# Patient Record
Sex: FEMALE | Race: WHITE | NOT HISPANIC OR LATINO | Employment: PART TIME | URBAN - METROPOLITAN AREA
[De-identification: names, ages, dates, MRNs, and addresses within clinical notes are randomized per-mention and may not be internally consistent; named-entity substitution may affect disease eponyms.]

---

## 2019-09-18 ENCOUNTER — TRANSFERRED RECORDS (OUTPATIENT)
Dept: MULTI SPECIALTY CLINIC | Facility: CLINIC | Age: 59
End: 2019-09-18

## 2019-09-18 LAB
HPV ABSTRACT: NORMAL
PAP-ABSTRACT: NORMAL

## 2020-02-12 ENCOUNTER — OFFICE VISIT (OUTPATIENT)
Dept: FAMILY MEDICINE | Facility: CLINIC | Age: 60
End: 2020-02-12
Payer: COMMERCIAL

## 2020-02-12 ENCOUNTER — ANCILLARY PROCEDURE (OUTPATIENT)
Dept: GENERAL RADIOLOGY | Facility: CLINIC | Age: 60
End: 2020-02-12
Attending: FAMILY MEDICINE
Payer: COMMERCIAL

## 2020-02-12 VITALS
RESPIRATION RATE: 18 BRPM | HEART RATE: 69 BPM | SYSTOLIC BLOOD PRESSURE: 106 MMHG | TEMPERATURE: 97.6 F | HEIGHT: 62 IN | DIASTOLIC BLOOD PRESSURE: 64 MMHG | BODY MASS INDEX: 24.8 KG/M2 | WEIGHT: 134.8 LBS | OXYGEN SATURATION: 97 %

## 2020-02-12 DIAGNOSIS — G89.29 CHRONIC RIGHT SHOULDER PAIN: ICD-10-CM

## 2020-02-12 DIAGNOSIS — M25.511 CHRONIC RIGHT SHOULDER PAIN: ICD-10-CM

## 2020-02-12 PROCEDURE — 73030 X-RAY EXAM OF SHOULDER: CPT | Mod: RT

## 2020-02-12 PROCEDURE — 99203 OFFICE O/P NEW LOW 30 MIN: CPT | Performed by: FAMILY MEDICINE

## 2020-02-12 RX ORDER — MULTIPLE VITAMINS W/ MINERALS TAB 9MG-400MCG
1 TAB ORAL DAILY
COMMUNITY
End: 2021-07-27

## 2020-02-12 ASSESSMENT — MIFFLIN-ST. JEOR: SCORE: 1136.7

## 2020-02-12 NOTE — PROGRESS NOTES
Subjective     Aixa Hester is a 59 year old female who presents to clinic today for the following health issues:    HPI     New Patient to [place]/Transfer care  Previous PCP: [facility]    Care everywhere obtained and reviewed    Joint Pain-right shoulder pain    Onset: 2019    Description:   Location: right shoulder  Character: intense pain     Intensity: pain with pressure     Progression of Symptoms: same    Accompanying Signs & Symptoms:  Other symptoms: radiation of pain down arm, numbness and tingling    History:   Previous similar pain: YES- previous injury to shoulder 22 years ago       Precipitating factors:     Trauma or overuse: none specific   Improved by: was seen by physical therapy through [facility]- patient has continued those exercises at home     Therapies Tried and outcome: PT- effective.  Requesting for refill.      HEALTH CARE MAINTENANCE: Up-to-date with a Pap test  Due for annual physical in 2020.    Patient Active Problem List   Diagnosis     Chronic right shoulder pain     Past Surgical History:   Procedure Laterality Date     arm surgery Left      COLONOSCOPY       COLONOSCOPY  10/29/2012    Procedure: COLONOSCOPY;  Colonoscopy;  Surgeon: Tommy Alegre MD;  Location: [facility]       Social History     Tobacco Use     Smoking status: Never Smoker     Smokeless tobacco: Never Used   Substance Use Topics     Alcohol use: Yes     Comment: seldom     Family History   Problem Relation Age of Onset     Cerebral aneurysm Mother         at age 72     Lung Cancer Mother      Coronary Artery Disease Father         Atrial Fib,  at 87     Breast Cancer No family hx of      Colon Cancer No family hx of          Current Outpatient Medications   Medication Sig Dispense Refill     calcium-magnesium (CALMAG) 500-250 MG TABS per tablet Take 1 tablet by mouth 2 times daily       Fish Oil-Cholecalciferol (OMEGA-3 FISH OIL/VITAMIN D3 PO)        melatonin 5 MG tablet Take 5 mg by  "mouth nightly as needed for sleep       Multiple Vitamins-Minerals (PRESERVISION AREDS 2 PO)        multivitamin w/minerals (MULTI-VITAMIN) tablet Take 1 tablet by mouth daily       Allergies   Allergen Reactions     Nka [No Known Allergies]        Reviewed and updated as needed this visit by Provider  Tobacco  Med Hx  Surg Hx  Fam Hx  Soc Hx        Review of Systems   ROS COMP: Constitutional, HEENT, cardiovascular, pulmonary, gi and gu systems are negative, except as otherwise noted.      Objective    /64   Pulse 69   Temp 97.6  F (36.4  C) (Tympanic)   Resp 18   Ht 1.57 m (5' 1.81\")   Wt 61.1 kg (134 lb 12.8 oz)   SpO2 97%   Breastfeeding No   BMI 24.81 kg/m    Body mass index is 24.81 kg/m .  Physical Exam   GENERAL: healthy, alert and no distress  EYES: Eyes grossly normal to inspection, PERRL and conjunctivae and sclerae normal  HENT: ear canals and TM's normal, nose and mouth without ulcers or lesions  NECK: no adenopathy, no asymmetry, masses, or scars and thyroid normal to palpation  RESP: lungs clear to auscultation - no rales, rhonchi or wheezes  CV: regular rate and rhythm, normal S1 S2, no S3 or S4, no murmur, click or rub, no peripheral edema and peripheral pulses strong  MS: Right shoulder no significant AC or deltoid tenderness.  Normal strength and muscle mass, no deformities, no erythema, induration, or nodules, no evidence of joint effusion. Painful arc with otherwise FROM.     Diagnostic Test Results:  Labs reviewed in Epic  See orders below.        Assessment & Plan     Aixa was seen today for shoulder pain.    Diagnoses and all orders for this visit:    Chronic right shoulder pain, suspect rotator cuff disease  -     PHYSICAL THERAPY REFERRAL  -     XR Shoulder Right 2 Views-if unremarkable and shoulder pain persists, will consider a shoulder MRI to further evaluate.  -    OTC NSAID as needed.      We will notify her with results.    Return in about 6 months (around " 8/12/2020) for Physical Exam and as needed throughout the year.    Carlene Greer MD  [facility]

## 2020-02-21 ENCOUNTER — THERAPY VISIT (OUTPATIENT)
Dept: PHYSICAL THERAPY | Facility: CLINIC | Age: 60
End: 2020-02-21
Payer: COMMERCIAL

## 2020-02-21 DIAGNOSIS — G89.29 CHRONIC RIGHT SHOULDER PAIN: Primary | ICD-10-CM

## 2020-02-21 DIAGNOSIS — M25.511 CHRONIC RIGHT SHOULDER PAIN: Primary | ICD-10-CM

## 2020-02-21 PROCEDURE — 97112 NEUROMUSCULAR REEDUCATION: CPT | Mod: GP | Performed by: PHYSICAL THERAPIST

## 2020-02-21 PROCEDURE — 97161 PT EVAL LOW COMPLEX 20 MIN: CPT | Mod: GP | Performed by: PHYSICAL THERAPIST

## 2020-02-21 PROCEDURE — 97110 THERAPEUTIC EXERCISES: CPT | Mod: GP | Performed by: PHYSICAL THERAPIST

## 2020-02-21 NOTE — PROGRESS NOTES
"Physical Therapy Initial Evaluation  February 20, 2020     MD Instructions/Precautions/Restrictions: PT eval and treat.     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 Skilled PT services are necessary in order to reduce impairments and improve independent function.     Subjective:   Date of Onset: 8/1/2019  C/C: chronic right shoulder pain; RHD  Location: front of shoulder on the right, travels down front and lateral side of arm; used to travel to the fingers (not sure which ones) but that is diminishing  Previous: PT for 1 visit focused treatment on the back exercises (rows, IR/ER, pull down), massage  Quality of pain is aching and sharp. Denies locking and deep pain. Pains are described as intermittent in nature. Pain is worse: not dependent on time of day. Pain is rated 2-3/10 with pain medication; 4-5/10  History of symptoms: 1998 had a fall - sent to PT and recovered to 95%. Pain began suddenly as the result of insidious onset. Since onset, symptoms are improving.  Worsened by: bicep curl at 90 in Pilates class, reaching across, overhead stretch up, push up  Alleviated by: PT exercises, massage, activity afterwards  General health as reported by patient: good  Pertinent medical/surgical history: Refer to health history in EMR. Imaging: x-ray. Current occupational status: , . Patient's goals are: decrease pain, dressing without pain, return to cleaning and cooking no issues. Return to MD:  PRN.     Objective:  SHOULDER:    Alignment:  Depression: normal  UR/DR increased DR R>L  Abduction/add R >3\"  IR/ER increased ER L>R    Cervical Screen: normal + OP    AROM Shoulder Flexion/Abduction  Decreased eccentric scapular control- increased DR    Correction Secondary Test decrease in pain with " assist to control DR    Shoulder:   PROM R AROM L AROM R MMT L MMT R   Flex 170 170 165* with lowering onset at 90 5/5 +3/5*   Abd  170 165* with lowering onset at 90 worse than flexion     IR  T5 L1 5/5 5/5   ER  70 70* 5/5 4/5*     Special tests:   L R   Impingement     Neer's  -   Hawkin's-Satish  -   Labral     Talbot's  +   Crank     Instability     Apprehension (anterior)     Relocation (anterior)     Biceps      Speed's  +   Yeargsons  +   Bicep Load     Rotator Cuff Tear     Drop Arm  -   Belly Press     Lift off      ER lag at 90/90     ER lat at 45/45       Palpation: bicipital groove reproduce pain    Assessment/Plan:    The patient is a 59 year old female with chief complaint of chronic right shoulder pain.    The patient has the following significant findings with corresponding treatment plan.  Diagnosis 1:  Right shoulder pain    Pain -  self management, education and home program  Decreased ROM/flexibility - manual therapy, therapeutic exercise and home program  Decreased joint mobility - manual therapy, therapeutic exercise and home program  Decreased strength - therapeutic exercise, therapeutic activities and home program  Impaired balance - neuro re-education, therapeutic activities and home program  Decreased proprioception - neuro re-education and therapeutic activities  Impaired gait - gait training and assistive devices  Impaired muscle performance - neuro re-education and home program  Decreased function - therapeutic activities and home program  Impaired posture - neuro re-education, therapeutic activities and home program  Instability -  Therapeutic Activity, Therapeutic Exercise, Neuromuscular Re-education, Splinting/Taping/Bracing/Orthotic, home program    Therapy Evaluation Codes:   1) History comprised of:   Personal factors that impact the plan of care:      Please refer to health history in EMR.    Comorbidity factors that impact the plan of care are:      Please refer to health  history in EMR.     Medications impacting care: None.  2) Examination of Body Systems comprised of:   Body structures and functions that impact the plan of care:      Shoulder.   Activity limitations that impact the plan of care are:      Cooking, Dressing, Lifting and Sleeping.   Clinical presentation characteristics are:    Stable/Uncomplicated.  3) Presentation comprised of:   Presentation scored as Low complexity with uncomplicated characteristics..  4) Decision-Making    Low complexity using standardized patient assessment instrument and/or measureable assessment of functional outcome.  Cumulative Therapy Evaluation is: Low complexity.    Previous and current functional limitations:  (See Goal Flow Sheet for this information)    Short term and Long term goals: (See Goal Flow Sheet for this information)     Communication ability:  Patient appears to be able to clearly communicate and understand verbal and written communication and follow directions correctly.  Treatment Explanation - The following has been discussed with the patient: RX ordered/plan of care, anticipated outcomes, and possible risks and side effects.  This patient would benefit from PT intervention to resume normal activities.   Rehab potential is excellent.    Frequency:  1 X week, once daily  Duration:  for 6 weeks  Discharge Plan: Achieve all LTGs, be independent in home treatment program, and reach maximal therapeutic benefit.    Please refer to the daily flowsheet for treatment today, total treatment time and time spent performing 1:1 timed codes.

## 2020-02-28 ENCOUNTER — THERAPY VISIT (OUTPATIENT)
Dept: PHYSICAL THERAPY | Facility: CLINIC | Age: 60
End: 2020-02-28
Payer: COMMERCIAL

## 2020-02-28 DIAGNOSIS — G89.29 CHRONIC RIGHT SHOULDER PAIN: ICD-10-CM

## 2020-02-28 DIAGNOSIS — M25.511 CHRONIC RIGHT SHOULDER PAIN: ICD-10-CM

## 2020-02-28 PROCEDURE — 97110 THERAPEUTIC EXERCISES: CPT | Mod: GP | Performed by: PHYSICAL THERAPIST

## 2020-02-28 PROCEDURE — 97112 NEUROMUSCULAR REEDUCATION: CPT | Mod: GP | Performed by: PHYSICAL THERAPIST

## 2020-02-28 NOTE — PROGRESS NOTES
Assessment/Plan:    DISCHARGE REPORT    Progress reporting period is from 2/21/20 to 2/28/20.       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 Wants to go over HEP and which exercises to perform if shoulder gets worse  Current pain level is 0/10  Previous pain level was  4/10  .   Changes in function:  Yes (See Goal flowsheet attached for changes in current functional level)  Adverse reaction to treatment or activity: None    OBJECTIVE  Changes noted in objective findings:      Shoulder:    PROM R AROM L AROM R   Flex 170 170 170   Abd   170 170* with lowering onset at 90    IR   T5 L1   ER   70 70* 1/10     ASSESSMENT/PLAN  Updated problem list and treatment plan: Diagnosis 1:  Right Shoulder pain  Pain -  self management, education and home program  Decreased strength - therapeutic exercise and therapeutic activities  STG/LTGs have been met or progress has been made towards goals:  Yes (See Goal flow sheet completed today.)  Assessment of Progress: The patient's condition is improving.  Self Management Plans:  Patient has been instructed in a home treatment program.  I have re-evaluated this patient and find that the nature, scope, duration and intensity of the therapy is appropriate for the medical condition of the patient.  Aixa continues to require the following intervention to meet STG and LTG's:  PT intervention is no longer required to meet STG/LTG.    Recommendations:  This patient is ready to be discharged from therapy and continue their home treatment program.    Please refer to the daily flowsheet for treatment today, total treatment time and time spent performing 1:1 timed codes.

## 2020-03-19 ENCOUNTER — TELEPHONE (OUTPATIENT)
Dept: FAMILY MEDICINE | Facility: CLINIC | Age: 60
End: 2020-03-19

## 2020-03-19 NOTE — TELEPHONE ENCOUNTER
Patient is due for shingles booster in next few weeks. She received first one at [facility] in September, but due to changing insurance she needs to come to [place]. Please call patient.

## 2020-07-06 ENCOUNTER — VIRTUAL VISIT (OUTPATIENT)
Dept: FAMILY MEDICINE | Facility: OTHER | Age: 60
End: 2020-07-06

## 2020-07-06 ENCOUNTER — NURSE TRIAGE (OUTPATIENT)
Dept: NURSING | Facility: CLINIC | Age: 60
End: 2020-07-06

## 2020-07-06 NOTE — PROGRESS NOTES
"Date: 2020 08:13:46  Clinician: Damon Sarmiento  Clinician NPI: [id number]  Patient: Aixa Hester  Patient : 1960  Patient Address: [address] [place], [place] [address] [address]  Patient Phone: [phone]  Visit Protocol: URI  Patient Summary:  Aixa is a 59 year old ( : 1960 ) female who initiated a Visit for COVID-19 (Coronavirus) evaluation and screening. When asked the question \"Please sign me up to receive news, health information and promotions from [employer].\", Aixa responded \"No\".    Aixa states her symptoms started 1-2 days ago.   Her symptoms consist of diarrhea, malaise, ear pain, a sore throat, and myalgia. She is experiencing mild difficulty breathing with activities but can speak normally in full sentences.   Symptom details   Sore throat: Aixa reports having mild throat pain (1-3 on a 10 point pain scale), does not have exudate on her tonsils, and can swallow liquids. She is not sure if the lymph nodes in her neck are enlarged. A rash has not appeared on the skin since the sore throat started.    Aixa denies having wheezing, nausea, teeth pain, ageusia, vomiting, rhinitis, headache, chills, anosmia, facial pain or pressure, fever, cough, and nasal congestion. She also denies having recent facial or sinus surgery in the past 60 days and taking antibiotic medication in the past month.   Precipitating events  Aixa is not sure if she has been exposed to someone with strep throat. She has not recently been exposed to someone with influenza. Aixa has been in close contact with the following high risk individuals: people with asthma, heart disease or diabetes and adults 65 or older.   Pertinent COVID-19 (Coronavirus) information  In the past 14 days, Aixa has not worked in a congregate living setting.   She does not work or volunteer as healthcare worker or a  and does not work or volunteer in a healthcare facility.   Aixa also has not lived in a congregate living " setting in the past 14 days. She does not live with a healthcare worker.   Aixa has not had a close contact with a laboratory-confirmed COVID-19 patient within 14 days of symptom onset.   Pertinent medical history  Aixa does not get yeast infections when she takes antibiotics.   Aixa does not need a return to work/school note.   Weight: 135 lbs   Aixa does not smoke or use smokeless tobacco.   Weight: 135 lbs    MEDICATIONS: No current medications, ALLERGIES: NKDA  Clinician Response:  Dear Aixa,   Your symptoms show that you may have coronavirus (COVID-19). This illness can cause fever, cough and trouble breathing. Many people get a mild case and get better on their own. Some people can get very sick.  What should I do?  We would like to test you for this virus.   1. Please call [phone] to schedule your visit. Explain that you were referred by [facility] to have a COVID-19 test. Be ready to share your [facility] visit ID number.  The following will serve as your written order for this COVID Test, ordered by me, for the indication of suspected COVID [Z20.828]: The test will be ordered in [employer], our electronic health record, after you are scheduled. It will show as ordered and authorized by Antoni Roque MD.  Order: COVID-19 (Coronavirus) PCR for SYMPTOMATIC testing from [facility].      2. When it's time for your COVID test:  Stay at least 6 feet away from others. (If someone will drive you to your test, stay in the backseat, as far away from the  as you can.)   Cover your mouth and nose with a mask, tissue or washcloth.  Go straight to the testing site. Don't make any stops on the way there or back.      3.Starting now: Stay home and away from others (self-isolate) until:   You've had no fever---and no medicine that reduces fever---for 3 full days (72 hours). And...   Your other symptoms have gotten better. For example, your cough or breathing has improved. And...   At least 10 days have passed since your symptoms  "started.       During this time, don't leave the house except for testing or medical care.   Stay in your own room, even for meals. Use your own bathroom if you can.   Stay away from others in your home. No hugging, kissing or shaking hands. No visitors.  Don't go to work, school or anywhere else.    Clean \"high touch\" surfaces often (doorknobs, counters, handles, etc.). Use a household cleaning spray or wipes. You'll find a full list of  on the EPA website: www.epa.gov/pesticide-registration/list-n-disinfectants-use-against-sars-cov-2.   Cover your mouth and nose with a mask, tissue or washcloth to avoid spreading germs.  Wash your hands and face often. Use soap and water.  Caregivers in these groups are at risk for severe illness due to COVID-19:  o People 65 years and older  o People who live in a nursing home or long-term care facility  o People with chronic disease (lung, heart, cancer, diabetes, kidney, liver, immunologic)  o People who have a weakened immune system, including those who:   Are in cancer treatment  Take medicine that weakens the immune system, such as corticosteroids  Had a bone marrow or organ transplant  Have an immune deficiency  Have poorly controlled HIV or AIDS  Are obese (body mass index of 40 or higher)  Smoke regularly   o Caregivers should wear gloves while washing dishes, handling laundry and cleaning bedrooms and bathrooms.  o Use caution when washing and drying laundry: Don't shake dirty laundry, and use the warmest water setting that you can.  o For more tips, go to www.cdc.gov/coronavirus/2019-ncov/downloads/10Things.pdf.    4.Sign up for [employer]. We know it's scary to hear that you might have COVID-19. We want to track your symptoms to make sure you're okay over the next 2 weeks. Please look for an email from Lizeth Vanessa---this is a free, online program that we'll use to keep in touch. To sign up, follow the link in the email. Learn more at " [email or web address]  How can I take care of myself?   Get lots of rest. Drink extra fluids (unless a doctor has told you not to).   Take Tylenol (acetaminophen) for fever or pain. If you have liver or kidney problems, ask your family doctor if it's okay to take Tylenol.   Adults can take either:    650 mg (two 325 mg pills) every 4 to 6 hours, or...   1,000 mg (two 500 mg pills) every 8 hours as needed.    Note: Don't take more than 3,000 mg in one day. Acetaminophen is found in many medicines (both prescribed and over-the-counter medicines). Read all labels to be sure you don't take too much.   For children, check the Tylenol bottle for the right dose. The dose is based on the child's age or weight.    If you have other health problems (like cancer, heart failure, an organ transplant or severe kidney disease): Call your specialty clinic if you don't feel better in the next 2 days.       Know when to call 911. Emergency warning signs include:    Trouble breathing or shortness of breath Pain or pressure in the chest that doesn't go away Feeling confused like you haven't felt before, or not being able to wake up Bluish-colored lips or face.  Where can I get more information?   [facility] -- About COVID-19: [email or web address]   CDC -- What to Do If You're Sick: www.cdc.gov/coronavirus/2019-ncov/about/steps-when-sick.html   CDC -- Ending Home Isolation: www.cdc.gov/coronavirus/2019-ncov/hcp/disposition-in-home-patients.html   CDC -- Caring for Someone: www.cdc.gov/coronavirus/2019-ncov/if-you-are-sick/care-for-someone.html   [facility] -- Interim Guidance for Hospital Discharge to Home: www.health.[facility].mn.us/diseases/coronavirus/hcp/hospdischarge.pdf   [facility] clinical trials (COVID-19 research studies): clinicalaffairs.[facility]-clinical-trials    Below are the COVID-19 hotlines at the [place] Department of Health ([facility]). Interpreters are available.    [facility]  questions: Call 517-530-0430 or 1-219.126.5486 (7 a.m. to 7 p.m.) For questions about schools and childcare: Call 206-682-9284 or 1-797.298.7868 (7 a.m. to 7 p.m.)    Diagnosis: Acute upper respiratory infection, unspecified  Diagnosis ICD: J06.9

## 2020-07-06 NOTE — TELEPHONE ENCOUNTER
Aixa with bilat inner ear pain, slightly sore throat and chest tightness.  Chest tightness improved after warm fluids, no SOB but is inhibiting deep inspiration.  Ears occasionally itchy, which she does have with minor seasonal allergies.  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      Reason for Disposition    Chest pain or pressure    [1] COVID-19 diagnosed by HCP (doctor, NP or PA) AND [2] mild symptoms (e.g., cough, fever, others) AND [3] no complications or SOB    Protocols used: CORONAVIRUS (COVID-19) DIAGNOSED OR [id number] 5.16.20      COVID 19 Nurse Triage Plan/Patient Instructions    Please be aware that novel coronavirus (COVID-19) may be circulating in the community. If you develop symptoms such as fever, cough, or SOB or if you have concerns about the presence of another infection including coronavirus (COVID-19), please contact your health care provider or visit [email or web address].     Disposition/Instructions    Patient to schedule a Virtual Visit with provider. Reference Visit Selection Guide.    Thank you for taking steps to prevent the spread of this virus.  o Limit your contact with others.  o Wear a simple mask to cover your cough.  o Wash your hands well and often.    Resources    [facility]: About COVID-19: [email or web address]    CDC: What to Do If You're Sick: www.cdc.gov/coronavirus/2019-ncov/about/steps-when-sick.html    CDC: Ending Home Isolation: www.cdc.gov/coronavirus/2019-ncov/hcp/disposition-in-home-patients.html     CDC: Caring for Someone: www.cdc.gov/coronavirus/2019-ncov/if-you-are-sick/care-for-someone.html     [facility]: Interim Guidance for Hospital Discharge to Home: www.health.[facility].[place].us/diseases/coronavirus/hcp/hospdischarge.pdf    [facility] clinical trials (COVID-19 research studies): clinicalaffairs.[facility]/umn-clinical-trials     Below are the COVID-19 hotlines at the [place] Department of Health ([facility]). Interpreters  are available.   o For health questions: Call 838-968-4363 or 1-459.291.3833 (7 a.m. to 7 p.m.)  o For questions about schools and childcare: Call 859-006-9050 or 1-186.792.3268 (7 a.m. to 7 p.m.)

## 2020-07-07 DIAGNOSIS — Z20.822 ENCOUNTER FOR LABORATORY TESTING FOR COVID-19 VIRUS: Primary | ICD-10-CM

## 2020-07-07 PROCEDURE — 99207 ZZC NO CHARGE LOS: CPT

## 2020-07-07 PROCEDURE — U0003 INFECTIOUS AGENT DETECTION BY NUCLEIC ACID (DNA OR RNA); SEVERE ACUTE RESPIRATORY SYNDROME CORONAVIRUS 2 (SARS-COV-2) (CORONAVIRUS DISEASE [COVID-19]), AMPLIFIED PROBE TECHNIQUE, MAKING USE OF HIGH THROUGHPUT TECHNOLOGIES AS DESCRIBED BY CMS-2020-01-R: HCPCS | Performed by: FAMILY MEDICINE

## 2020-07-07 NOTE — LETTER
July 11, 2020        Aixa Hester  [address] [address]    This letter provides a written record that you were tested for COVID-19 on 7/7/20.       Your result was negative. This means that we didn t find the virus that causes COVID-19 in your sample. A test may show negative when you do actually have the virus. This can happen when the virus is in the early stages of infection, before you feel illness symptoms.    If you have symptoms   Stay home and away from others (self-isolate) until you meet ALL of the guidelines below:    You ve had no fever--and no medicine that reduces fever--for 3 full days (72 hours). And      Your other symptoms have gotten better. For example, your cough or breathing has improved. And     At least 10 days have passed since your symptoms started.    During this time:    Stay home. Don t go to work, school or anywhere else.     Stay in your own room, including for meals. Use your own bathroom if you can.    Stay away from others in your home. No hugging, kissing or shaking hands. No visitors.    Clean  high touch  surfaces often (doorknobs, counters, handles, etc.). Use a household cleaning spray or wipes. You can find a full list on the EPA website at www.epa.gov/pesticide-registration/list-n-disinfectants-use-against-sars-cov-2.    Cover your mouth and nose with a mask, tissue or washcloth to avoid spreading germs.    Wash your hands and face often with soap and water.    Going back to work  Check with your employer for any guidelines to follow for going back to work.    Employers: This document serves as formal notice that your employee tested negative for COVID-19, as of the testing date shown above.

## 2020-07-08 LAB
SARS-COV-2 RNA SPEC QL NAA+PROBE: NOT DETECTED
SPECIMEN SOURCE: NORMAL

## 2020-08-19 ENCOUNTER — OFFICE VISIT (OUTPATIENT)
Dept: DERMATOLOGY | Facility: CLINIC | Age: 60
End: 2020-08-19
Payer: COMMERCIAL

## 2020-08-19 VITALS — SYSTOLIC BLOOD PRESSURE: 93 MMHG | HEART RATE: 66 BPM | OXYGEN SATURATION: 99 % | DIASTOLIC BLOOD PRESSURE: 59 MMHG

## 2020-08-19 DIAGNOSIS — L72.0 EPIDERMAL CYST: ICD-10-CM

## 2020-08-19 DIAGNOSIS — L81.4 LENTIGO: ICD-10-CM

## 2020-08-19 DIAGNOSIS — L82.0 INFLAMED SEBORRHEIC KERATOSIS: Primary | ICD-10-CM

## 2020-08-19 DIAGNOSIS — D18.01 CHERRY ANGIOMA: ICD-10-CM

## 2020-08-19 DIAGNOSIS — D22.9 MULTIPLE BENIGN NEVI: ICD-10-CM

## 2020-08-19 PROCEDURE — 17110 DESTRUCTION B9 LES UP TO 14: CPT | Performed by: PHYSICIAN ASSISTANT

## 2020-08-19 PROCEDURE — 99203 OFFICE O/P NEW LOW 30 MIN: CPT | Mod: 25 | Performed by: PHYSICIAN ASSISTANT

## 2020-08-19 NOTE — LETTER
2020         RE: Aixa Hester  [address] [facility] [address]        Dear Colleague,    Thank you for referring your patient, Aixa Hester, to the [facility]. Please see a copy of my visit note below.    Aixa Hester is a 59 year old year old female patient here today for brown spots on chest, neck and back. She notes that they are bothersome. No pain or bleeding. She also notes cyst on shoulder.  Patient has no other skin complaints today.  Remainder of the HPI, Meds, PMH, Allergies, FH, and SH was reviewed in chart.    Pertinent Hx:   No personal or family history of skin cancer.   Past Medical History:   Diagnosis Date     Chronic right shoulder pain     from traumatic injury     Eczema        Past Surgical History:   Procedure Laterality Date     arm surgery Left      COLONOSCOPY       COLONOSCOPY  10/29/2012    Procedure: COLONOSCOPY;  Colonoscopy;  Surgeon: Tommy Alegre MD;  Location: [facility]        Family History   Problem Relation Age of Onset     Cerebral aneurysm Mother         at age 72     Lung Cancer Mother      Coronary Artery Disease Father         Atrial Fib,  at 87     Breast Cancer No family hx of      Colon Cancer No family hx of        Social History     Socioeconomic History     Marital status:      Spouse name: Not on file     Number of children: Not on file     Years of education: Not on file     Highest education level: Not on file   Occupational History     Not on file   Social Needs     Financial resource strain: Not on file     Food insecurity     Worry: Not on file     Inability: Not on file     Transportation needs     Medical: Not on file     Non-medical: Not on file   Tobacco Use     Smoking status: Never Smoker     Smokeless tobacco: Never Used   Substance and Sexual Activity     Alcohol use: Yes     Comment: seldom     Drug use: Never     Sexual activity: Not Currently   Lifestyle     Physical activity     Days per week: Not on  file     Minutes per session: Not on file     Stress: Not on file   Relationships     Social connections     Talks on phone: Not on file     Gets together: Not on file     Attends Taoism service: Not on file     Active member of club or organization: Not on file     Attends meetings of clubs or organizations: Not on file     Relationship status: Not on file     Intimate partner violence     Fear of current or ex partner: Not on file     Emotionally abused: Not on file     Physically abused: Not on file     Forced sexual activity: Not on file   Other Topics Concern     Parent/sibling w/ CABG, MI or angioplasty before 65F 55M? Not Asked   Social History Narrative     Not on file       Outpatient Encounter Medications as of 8/19/2020   Medication Sig Dispense Refill     calcium-magnesium (CALMAG) 500-250 MG TABS per tablet Take 1 tablet by mouth 2 times daily       Fish Oil-Cholecalciferol (OMEGA-3 FISH OIL/VITAMIN D3 PO)        melatonin 5 MG tablet Take 5 mg by mouth nightly as needed for sleep       Multiple Vitamins-Minerals (PRESERVISION AREDS 2 PO)        multivitamin w/minerals (MULTI-VITAMIN) tablet Take 1 tablet by mouth daily       No facility-administered encounter medications on file as of 8/19/2020.              Review Of Systems  Skin: As above  Eyes: negative  Ears/Nose/Throat: negative  Respiratory: No shortness of breath, dyspnea on exertion, cough, or hemoptysis  Cardiovascular: negative  Gastrointestinal: negative  Genitourinary: negative  Musculoskeletal: negative  Neurologic: negative  Psychiatric: negative  Hematologic/Lymphatic/Immunologic: negative  Endocrine: negative      O:   NAD, WDWN, Alert & Oriented, Mood & Affect wnl, Vitals stable   Here today alone   BP 93/59   Pulse 66   SpO2 99%    General appearance normal   Vitals stable   Alert, oriented and in no acute distress     Skin colored nodule on right posterior shoulder   Stuck on papules and brown macules on trunk and ext   Red  papules on trunk  Brown papules and macules with regular pigment network and borders on torso and extremities     The remainder of skin exam is normal       Eyes: Conjunctivae/lids:Normal     ENT: Lips: normal    MSK:Normal    Cardiovascular: peripheral edema none    Pulm: Breathing Normal    Lymph Nodes: No Head and Neck Lymphadenopathy     Neuro/Psych: Orientation:Alert and Orientedx3 ; Mood/Affect:normal   A/P:  1. Inflamed seborrheic keratosis on left back and neck, right chest  LN2:  Treated with LN2 for 5s for 1-2 cycles. Warned risks of blistering, pain, pigment change, scarring, and incomplete resolution.  Advised patient to return if lesions do not completely resolve.  Wound care sheet given.  2. Cyst on right posterior shoulder   Discussed excision, will schedule with Dr. Edge if bothersome.   3. Seborrheic keratosis, lentigo, angioma,  Benign nevi   BENIGN LESIONS DISCUSSED WITH PATIENT:  I discussed  explained that treatment of these lesions would be purely cosmetic and not medically neccessary.  I discussed with patient different removal options including excision, cautery and /or laser.      Nature and genetics of benign skin lesions dicussed with patient.  Signs and Symptoms of skin cancer discussed with patient.  ABCDEs of melanoma reviewed with patient.  Patient encouraged to perform monthly skin exams.  UV precautions reviewed with patient.  Risks of non-melanoma skin cancer discussed with patient   Return to clinic in one year or sooner if needed.       Again, thank you for allowing me to participate in the care of your patient.        Sincerely,        Dora Edgar PA-C

## 2020-08-19 NOTE — NURSING NOTE
Chief Complaint   Patient presents with     Skin Check       Vitals:    08/19/20 0948   BP: 93/59   Pulse: 66   SpO2: 99%     Wt Readings from Last 1 Encounters:   02/12/20 61.1 kg (134 lb 12.8 oz)       Kecia Colon LPN.................8/19/2020

## 2020-08-24 NOTE — PROGRESS NOTES
Aixa Hester is a 59 year old year old female patient here today for brown spots on chest, neck and back. She notes that they are bothersome. No pain or bleeding. She also notes cyst on shoulder.  Patient has no other skin complaints today.  Remainder of the HPI, Meds, PMH, Allergies, FH, and SH was reviewed in chart.    Pertinent Hx:   No personal or family history of skin cancer.   Past Medical History:   Diagnosis Date     Chronic right shoulder pain     from traumatic injury     Eczema        Past Surgical History:   Procedure Laterality Date     arm surgery Left      COLONOSCOPY       COLONOSCOPY  10/29/2012    Procedure: COLONOSCOPY;  Colonoscopy;  Surgeon: Tommy Alegre MD;  Location: [facility]        Family History   Problem Relation Age of Onset     Cerebral aneurysm Mother         at age 72     Lung Cancer Mother      Coronary Artery Disease Father         Atrial Fib,  at 87     Breast Cancer No family hx of      Colon Cancer No family hx of        Social History     Socioeconomic History     Marital status:      Spouse name: Not on file     Number of children: Not on file     Years of education: Not on file     Highest education level: Not on file   Occupational History     Not on file   Social Needs     Financial resource strain: Not on file     Food insecurity     Worry: Not on file     Inability: Not on file     Transportation needs     Medical: Not on file     Non-medical: Not on file   Tobacco Use     Smoking status: Never Smoker     Smokeless tobacco: Never Used   Substance and Sexual Activity     Alcohol use: Yes     Comment: seldom     Drug use: Never     Sexual activity: Not Currently   Lifestyle     Physical activity     Days per week: Not on file     Minutes per session: Not on file     Stress: Not on file   Relationships     Social connections     Talks on phone: Not on file     Gets together: Not on file     Attends Anabaptism service: Not on file     Active member of club or  organization: Not on file     Attends meetings of clubs or organizations: Not on file     Relationship status: Not on file     Intimate partner violence     Fear of current or ex partner: Not on file     Emotionally abused: Not on file     Physically abused: Not on file     Forced sexual activity: Not on file   Other Topics Concern     Parent/sibling w/ CABG, MI or angioplasty before 65F 55M? Not Asked   Social History Narrative     Not on file       Outpatient Encounter Medications as of 8/19/2020   Medication Sig Dispense Refill     calcium-magnesium (CALMAG) 500-250 MG TABS per tablet Take 1 tablet by mouth 2 times daily       Fish Oil-Cholecalciferol (OMEGA-3 FISH OIL/VITAMIN D3 PO)        melatonin 5 MG tablet Take 5 mg by mouth nightly as needed for sleep       Multiple Vitamins-Minerals (PRESERVISION AREDS 2 PO)        multivitamin w/minerals (MULTI-VITAMIN) tablet Take 1 tablet by mouth daily       No facility-administered encounter medications on file as of 8/19/2020.              Review Of Systems  Skin: As above  Eyes: negative  Ears/Nose/Throat: negative  Respiratory: No shortness of breath, dyspnea on exertion, cough, or hemoptysis  Cardiovascular: negative  Gastrointestinal: negative  Genitourinary: negative  Musculoskeletal: negative  Neurologic: negative  Psychiatric: negative  Hematologic/Lymphatic/Immunologic: negative  Endocrine: negative      O:   NAD, WDWN, Alert & Oriented, Mood & Affect wnl, Vitals stable   Here today alone   BP 93/59   Pulse 66   SpO2 99%    General appearance normal   Vitals stable   Alert, oriented and in no acute distress     Skin colored nodule on right posterior shoulder   Stuck on papules and brown macules on trunk and ext   Red papules on trunk  Brown papules and macules with regular pigment network and borders on torso and extremities     The remainder of skin exam is normal       Eyes: Conjunctivae/lids:Normal     ENT: Lips: normal    MSK:Normal    Cardiovascular:  peripheral edema none    Pulm: Breathing Normal    Lymph Nodes: No Head and Neck Lymphadenopathy     Neuro/Psych: Orientation:Alert and Orientedx3 ; Mood/Affect:normal   A/P:  1. Inflamed seborrheic keratosis on left back and neck, right chest  LN2:  Treated with LN2 for 5s for 1-2 cycles. Warned risks of blistering, pain, pigment change, scarring, and incomplete resolution.  Advised patient to return if lesions do not completely resolve.  Wound care sheet given.  2. Cyst on right posterior shoulder   Discussed excision, will schedule with Dr. Edge if bothersome.   3. Seborrheic keratosis, lentigo, angioma,  Benign nevi   BENIGN LESIONS DISCUSSED WITH PATIENT:  I discussed  explained that treatment of these lesions would be purely cosmetic and not medically neccessary.  I discussed with patient different removal options including excision, cautery and /or laser.      Nature and genetics of benign skin lesions dicussed with patient.  Signs and Symptoms of skin cancer discussed with patient.  ABCDEs of melanoma reviewed with patient.  Patient encouraged to perform monthly skin exams.  UV precautions reviewed with patient.  Risks of non-melanoma skin cancer discussed with patient   Return to clinic in one year or sooner if needed.

## 2020-11-08 ENCOUNTER — HEALTH MAINTENANCE LETTER (OUTPATIENT)
Age: 60
End: 2020-11-08

## 2020-11-14 ENCOUNTER — VIRTUAL VISIT (OUTPATIENT)
Dept: FAMILY MEDICINE | Facility: OTHER | Age: 60
End: 2020-11-14

## 2020-11-14 NOTE — PROGRESS NOTES
"Date: 2020 10:00:48  Clinician: Anupam Wilkins  Clinician NPI: [id number]  Patient: Aixa Hester  Patient : 1960  Patient Address: [address] [place] [id number]  Patient Phone: [phone]  Visit Protocol: URI  Patient Summary:  Aixa is a 60 year old ( : 1960 ) female who initiated a [facility] Visit for COVID-19 (Coronavirus) evaluation and screening. When asked the question \"Please sign me up to receive news, health information and promotions from [facility].\", Aixa responded \"Yes\".    When asked when her symptoms started, Aixa reported that she does not have any symptoms.   She denies taking antibiotic medication in the past month and having recent facial or sinus surgery in the past 60 days.    Pertinent COVID-19 (Coronavirus) information  Aixa does not work or volunteer as healthcare worker or a . In the past 14 days, Aixa has not worked or volunteered at a healthcare facility or group living setting.   In the past 14 days, she also has not lived in a congregate living setting.   Aixa has had a close contact with a laboratory-confirmed COVID-19 patient in the last 14 days. She was exposed at her work. Date Aixa was exposed to the laboratory-confirmed COVID-19 patient: 2020   Additional information about contact with COVID-19 (Coronavirus) patient as reported by the patient (free text): Coworker   Aixa is not living in the same household with the COVID-19 positive patient. She was in an enclosed space for greater than 15 minutes with the COVID-19 patient.   During the encounter, both of them were wearing masks.   Since 2019, Aixa has been tested for COVID-19 and has had upper respiratory infection (URI) or influenza-like illness.      Result of COVID-19 test: Negative     Date of her COVID-19 test: 2020     Date(s) of previous URI or influenza-like illness (free-text): [id number]     Symptoms Aixa experienced during previous URI or " influenza-like illness as reported by the patient (free-text): Sore throat, general but not overwhelming fatigue        Pertinent medical history  Aixa typically gets a yeast infection when she takes antibiotics. She is not sure if she has used fluconazole (Diflucan) to treat previous yeast infections.   Aixa does not need a return to work/school note.   Weight: 135 lbs   Aixa does not smoke or use smokeless tobacco.   Additional information as reported by the patient (free text): I'm just wondering about whether/when I might be contagious to others, I am not feeling any symptoms and my coworker doesn't have positive results back from her test yet.   Weight: 135 lbs    MEDICATIONS: No current medications, ALLERGIES: NKDA  Clinician Response:  Dear Aixa,   Based on your exposure to COVID-19 (coronavirus), we would like to test you for this virus.  1. Please call [phone] to schedule your visit. Explain that you were referred by [facility] to have a COVID-19 test. Be ready to share your [facility] visit ID number.  * If you need to schedule in [facility] please call [phone] or for [employer] employees please call [phone].   * If you need to schedule in the [place] area please call [phone]. [employer] employees call [phone].   The following will serve as your written order for this COVID Test, ordered by me, for the indication of suspected COVID [Z20.828]: The test will be ordered in [employer], our electronic health record, after you are scheduled. It will show as ordered and authorized by Antoni Roque MD.  Order: COVID-19 (coronavirus) PCR for ASYMPTOMATIC EXPOSURE testing from [facility].   If you know you have had close contact with someone who tested positive, you should be quarantined for 14 days after this exposure. You should stay in quarantine for the14 days even if the covid test is negative, the optimal time to test after exposure is 5-7 days from the exposure  Quarantine means   What should I do?  For  safety, it's very important to follow these rules. Do this for 14 days after the date you were last exposed to the virus..  Stay home and away from others. Don't go to school or anywhere else. Generally quarantine means staying home from work but there are some exceptions to this. Please contact your workplace.   No hugging, kissing or shaking hands.  Don't let anyone visit.  Cover your mouth and nose with a mask, tissue or washcloth to avoid spreading germs.  Wash your hands and face often. Use soap and water.  What are the symptoms of COVID-19?  The most common symptoms are cough, fever and trouble breathing. Less common symptoms include headache, body aches, fatigue (feeling very tired), chills, sore throat, stuffy or runny nose, diarrhea (loose poop), loss of taste or smell, belly pain, and nausea or vomiting (feeling sick to your stomach or throwing up).  After 14 days, if you have still don't have symptoms, you likely don't have this virus.  If you develop symptoms, follow these guidelines.  If you're normally healthy: Please start another [facility] visit to report your symptoms. Go to [email or web address].  If you have a serious health problem (like cancer, heart failure, an organ transplant or kidney disease): Call your specialty clinic. Let them know that you might have COVID-19.  2. When it's time for your COVID test:  Stay at least 6 feet away from others. (If someone will drive you to your test, stay in the backseat, as far away from the  as you can.)  Cover your mouth and nose with a mask, tissue or washcloth.  Go straight to the testing site. Don't make any stops on the way there or back.  Please note  Caregivers in these groups are at risk for severe illness due to COVID-19:  o People 65 years and older  o People who live in a nursing home or long-term care facility  o People with chronic disease (lung, heart, cancer, diabetes, kidney, liver, immunologic)  o People who have a weakened immune system,  including those who:  Are in cancer treatment  Take medicine that weakens the immune system, such as corticosteroids  Had a bone marrow or organ transplant  Have an immune deficiency  Have poorly controlled HIV or AIDS  Are obese (body mass index of 40 or higher)  Smoke regularly  Where can I get more information?  [facility] -- About COVID-19: [email or web address]  CDC -- What to Do If You're Sick: www.cdc.gov/coronavirus/2019-ncov/about/steps-when-sick.html  CDC -- Ending Home Isolation: www.cdc.gov/coronavirus/2019-ncov/hcp/disposition-in-home-patients.html  [facility] -- Caring for Someone: www.cdc.gov/coronavirus/2019-ncov/if-you-are-sick/care-for-someone.html  [facility] -- Interim Guidance for Hospital Discharge to Home: www.health.[facility].mn./diseases/coronavirus/hcp/hospdischarge.pdf  [facility] clinical trials (COVID-19 research studies): clinicalaffairs.[facility]-clinical-trials  Below are the COVID-19 hotlines at the [place] Department of Health ([facility]). Interpreters are available.  For health questions: Call 199-929-5115 or 1-516.669.5259 (7 a.m. to 7 p.m.)  For questions about schools and childcare: Call 808-591-3122 or 1-217.890.9596 (7 a.m. to 7 p.m.)    Diagnosis: Contact with and (suspected) exposure to other viral communicable diseases  Diagnosis ICD: Z20.828

## 2020-11-16 DIAGNOSIS — Z20.822 SUSPECTED 2019 NOVEL CORONAVIRUS INFECTION: Primary | ICD-10-CM

## 2020-11-16 PROCEDURE — U0003 INFECTIOUS AGENT DETECTION BY NUCLEIC ACID (DNA OR RNA); SEVERE ACUTE RESPIRATORY SYNDROME CORONAVIRUS 2 (SARS-COV-2) (CORONAVIRUS DISEASE [COVID-19]), AMPLIFIED PROBE TECHNIQUE, MAKING USE OF HIGH THROUGHPUT TECHNOLOGIES AS DESCRIBED BY CMS-2020-01-R: HCPCS | Performed by: FAMILY MEDICINE

## 2020-11-17 LAB
SARS-COV-2 RNA SPEC QL NAA+PROBE: NOT DETECTED
SPECIMEN SOURCE: NORMAL

## 2021-04-10 ENCOUNTER — IMMUNIZATION (OUTPATIENT)
Dept: NURSING | Facility: CLINIC | Age: 61
End: 2021-04-10
Payer: COMMERCIAL

## 2021-04-10 PROCEDURE — 91301 PR COVID VAC MODERNA 100 MCG/0.5 ML IM: CPT

## 2021-04-10 PROCEDURE — 0011A PR COVID VAC MODERNA 100 MCG/0.5 ML IM: CPT

## 2021-05-08 ENCOUNTER — IMMUNIZATION (OUTPATIENT)
Dept: NURSING | Facility: CLINIC | Age: 61
End: 2021-05-08
Attending: INTERNAL MEDICINE
Payer: COMMERCIAL

## 2021-05-08 PROCEDURE — 0012A PR COVID VAC MODERNA 100 MCG/0.5 ML IM: CPT

## 2021-05-08 PROCEDURE — 91301 PR COVID VAC MODERNA 100 MCG/0.5 ML IM: CPT

## 2021-07-20 ASSESSMENT — ENCOUNTER SYMPTOMS
CHILLS: 0
NERVOUS/ANXIOUS: 1
NAUSEA: 0
SORE THROAT: 0
SHORTNESS OF BREATH: 0
HEMATOCHEZIA: 0
ABDOMINAL PAIN: 0
ARTHRALGIAS: 1
COUGH: 0
DYSURIA: 0
HEMATURIA: 0
HEADACHES: 0
CONSTIPATION: 0
MYALGIAS: 0
WEAKNESS: 0
FREQUENCY: 0
DIARRHEA: 0
EYE PAIN: 0
JOINT SWELLING: 1
PALPITATIONS: 0
FEVER: 0
DIZZINESS: 0
PARESTHESIAS: 0
BREAST MASS: 0
HEARTBURN: 0

## 2021-07-27 ENCOUNTER — OFFICE VISIT (OUTPATIENT)
Dept: FAMILY MEDICINE | Facility: CLINIC | Age: 61
End: 2021-07-27
Payer: COMMERCIAL

## 2021-07-27 VITALS
DIASTOLIC BLOOD PRESSURE: 60 MMHG | WEIGHT: 138.38 LBS | OXYGEN SATURATION: 98 % | SYSTOLIC BLOOD PRESSURE: 100 MMHG | RESPIRATION RATE: 16 BRPM | HEART RATE: 73 BPM | TEMPERATURE: 98.6 F | BODY MASS INDEX: 25.47 KG/M2 | HEIGHT: 62 IN

## 2021-07-27 DIAGNOSIS — Z13.29 SCREENING FOR THYROID DISORDER: ICD-10-CM

## 2021-07-27 DIAGNOSIS — Z12.11 SPECIAL SCREENING FOR MALIGNANT NEOPLASMS, COLON: ICD-10-CM

## 2021-07-27 DIAGNOSIS — Z13.1 SCREENING FOR DIABETES MELLITUS: ICD-10-CM

## 2021-07-27 DIAGNOSIS — Z00.00 ROUTINE GENERAL MEDICAL EXAMINATION AT A HEALTH CARE FACILITY: Primary | ICD-10-CM

## 2021-07-27 DIAGNOSIS — Z12.31 ENCOUNTER FOR SCREENING MAMMOGRAM FOR BREAST CANCER: ICD-10-CM

## 2021-07-27 DIAGNOSIS — M25.50 MULTIPLE JOINT PAIN: ICD-10-CM

## 2021-07-27 DIAGNOSIS — Z13.220 LIPID SCREENING: ICD-10-CM

## 2021-07-27 LAB
BASOPHILS # BLD AUTO: 0 10E3/UL (ref 0–0.2)
BASOPHILS NFR BLD AUTO: 1 %
CHOLEST SERPL-MCNC: 220 MG/DL
CRP SERPL-MCNC: <2.9 MG/L (ref 0–8)
EOSINOPHIL # BLD AUTO: 0.1 10E3/UL (ref 0–0.7)
EOSINOPHIL NFR BLD AUTO: 2 %
ERYTHROCYTE [DISTWIDTH] IN BLOOD BY AUTOMATED COUNT: 13.8 % (ref 10–15)
ERYTHROCYTE [SEDIMENTATION RATE] IN BLOOD BY WESTERGREN METHOD: 13 MM/HR (ref 0–30)
FASTING STATUS PATIENT QL REPORTED: YES
FASTING STATUS PATIENT QL REPORTED: YES
GLUCOSE BLD-MCNC: 85 MG/DL (ref 70–99)
HCT VFR BLD AUTO: 38.1 % (ref 35–47)
HDLC SERPL-MCNC: 70 MG/DL
HGB BLD-MCNC: 12.4 G/DL (ref 11.7–15.7)
LDLC SERPL CALC-MCNC: 135 MG/DL
LYMPHOCYTES # BLD AUTO: 1.4 10E3/UL (ref 0.8–5.3)
LYMPHOCYTES NFR BLD AUTO: 36 %
MCH RBC QN AUTO: 30 PG (ref 26.5–33)
MCHC RBC AUTO-ENTMCNC: 32.5 G/DL (ref 31.5–36.5)
MCV RBC AUTO: 92 FL (ref 78–100)
MONOCYTES # BLD AUTO: 0.4 10E3/UL (ref 0–1.3)
MONOCYTES NFR BLD AUTO: 9 %
NEUTROPHILS # BLD AUTO: 2 10E3/UL (ref 1.6–8.3)
NEUTROPHILS NFR BLD AUTO: 51 %
NONHDLC SERPL-MCNC: 150 MG/DL
PLATELET # BLD AUTO: 276 10E3/UL (ref 150–450)
RBC # BLD AUTO: 4.14 10E6/UL (ref 3.8–5.2)
T4 FREE SERPL-MCNC: 0.84 NG/DL (ref 0.76–1.46)
TRIGL SERPL-MCNC: 77 MG/DL
TSH SERPL DL<=0.005 MIU/L-ACNC: 4.43 MU/L (ref 0.4–4)
WBC # BLD AUTO: 3.9 10E3/UL (ref 4–11)

## 2021-07-27 PROCEDURE — 99396 PREV VISIT EST AGE 40-64: CPT | Performed by: NURSE PRACTITIONER

## 2021-07-27 PROCEDURE — 84443 ASSAY THYROID STIM HORMONE: CPT | Performed by: NURSE PRACTITIONER

## 2021-07-27 PROCEDURE — 36415 COLL VENOUS BLD VENIPUNCTURE: CPT | Performed by: NURSE PRACTITIONER

## 2021-07-27 PROCEDURE — 80061 LIPID PANEL: CPT | Performed by: NURSE PRACTITIONER

## 2021-07-27 PROCEDURE — 86140 C-REACTIVE PROTEIN: CPT | Performed by: NURSE PRACTITIONER

## 2021-07-27 PROCEDURE — 85652 RBC SED RATE AUTOMATED: CPT | Performed by: NURSE PRACTITIONER

## 2021-07-27 PROCEDURE — 99214 OFFICE O/P EST MOD 30 MIN: CPT | Mod: 25 | Performed by: NURSE PRACTITIONER

## 2021-07-27 PROCEDURE — 84439 ASSAY OF FREE THYROXINE: CPT | Performed by: NURSE PRACTITIONER

## 2021-07-27 PROCEDURE — 86038 ANTINUCLEAR ANTIBODIES: CPT | Performed by: NURSE PRACTITIONER

## 2021-07-27 PROCEDURE — 82947 ASSAY GLUCOSE BLOOD QUANT: CPT | Performed by: NURSE PRACTITIONER

## 2021-07-27 PROCEDURE — 86618 LYME DISEASE ANTIBODY: CPT | Performed by: NURSE PRACTITIONER

## 2021-07-27 PROCEDURE — 85025 COMPLETE CBC W/AUTO DIFF WBC: CPT | Performed by: NURSE PRACTITIONER

## 2021-07-27 ASSESSMENT — MIFFLIN-ST. JEOR: SCORE: 1142.97

## 2021-07-27 ASSESSMENT — PAIN SCALES - GENERAL: PAINLEVEL: NO PAIN (0)

## 2021-07-27 NOTE — PATIENT INSTRUCTIONS
Preventive Health Recommendations  Female Ages 50 - 64    Yearly exam: See your health care provider every year in order to  o Review health changes.   o Discuss preventive care.    o Review your medicines if your doctor has prescribed any.      Get a Pap test every three years (unless you have an abnormal result and your provider advises testing more often).    If you get Pap tests with HPV test, you only need to test every 5 years, unless you have an abnormal result.     You do not need a Pap test if your uterus was removed (hysterectomy) and you have not had cancer.    You should be tested each year for STDs (sexually transmitted diseases) if you're at risk.     Have a mammogram every 1 to 2 years.    Have a colonoscopy at age 50, or have a yearly FIT test (stool test). These exams screen for colon cancer.      Have a cholesterol test every 5 years, or more often if advised.    Have a diabetes test (fasting glucose) every three years. If you are at risk for diabetes, you should have this test more often.     If you are at risk for osteoporosis (brittle bone disease), think about having a bone density scan (DEXA).    Shots: Get a flu shot each year. Get a tetanus shot every 10 years.    Nutrition:     Eat at least 5 servings of fruits and vegetables each day.    Eat whole-grain bread, whole-wheat pasta and brown rice instead of white grains and rice.    Get adequate Calcium and Vitamin D.     Lifestyle    Exercise at least 150 minutes a week (30 minutes a day, 5 days a week). This will help you control your weight and prevent disease.    Limit alcohol to one drink per day.    No smoking.     Wear sunscreen to prevent skin cancer.     See your dentist every six months for an exam and cleaning.    See your eye doctor every 1 to 2 years.    Patient Education     Hand and Wrist Mobility Exercises: Wrist Rotations  This exercise is designed to stretch and strengthen your forearm, hands, and wrists. Before beginning,  talk about this exercise with your healthcare provider and read through all the instructions. While exercising, breathe normally. If you feel any pain, stop the exercise. If pain continues, tell your healthcare provider.      Sit or stand upright. Grasp a hand weight or similar object in each hand. Keep your forearms by your sides. The palms of your wrists should face down or toward the back of your body.    Keeping your forearms by your sides, rotate your hands until your palms are facing up or toward the front of your body. Hold for 10 to 15 seconds. Then return to starting position.    Repeat 5 to 10 times. Do 2 to 3 sets a day.  [employer] last reviewed this educational content on 6/1/2020 2000-2021 [employer]. All rights reserved. This information is not intended as a substitute for professional medical care. Always follow your healthcare professional's instructions.           Patient Education     Hand and Wrist Exercises: Wrist Flexion    This exercise is designed to stretch your hands and wrists. Before beginning, read through all the instructions. While exercising, breathe normally. If you feel any pain, stop the exercise. If pain persists, tell your healthcare provider. Here are the steps for wrist flexion:    Hold your right or left hand in front of you with your palm down and elbow bent.    Grasp the back of that hand with your other hand. Pull back so your fingers point down as you straighten your arm. Feel a stretch in your forearm and wrist. Hold for 3 to 5 seconds. Then relax.    Repeat 10 to 15 times. Do 3 to 4 sets a day.  [employer] last reviewed this educational content on 6/1/2020 2000-2021 [employer]. All rights reserved. This information is not intended as a substitute for professional medical care. Always follow your healthcare professional's instructions.           Patient Education     Hand and Wrist Exercises: Finger  and Release      This exercise  stretches and strengthens your hands and wrists. Before starting, read through all the instructions. While exercising, breathe normally. If you feel any pain, stop the exercise. If pain persists, inform your healthcare provider.    Make a tight fist. (Or you can grasp a sponge or ball.) Hold for _____ seconds. Then relax.    Spread your fingers as far apart as possible. Hold for _____ seconds. Then relax.    Repeat _____ times for each hand. Do _____ sets a day.  [employer] last reviewed this educational content on 2/1/2018 2000-2021 [employer]. All rights reserved. This information is not intended as a substitute for professional medical care. Always follow your healthcare professional's instructions.           Patient Education     Exercises at Your Workstation: Hands and Feet    The easy exercises below can help relieve tension and soreness. Take a few minutes each day to do them right at your desk. They'll loosen up your muscles, keep you more alert, and make a big difference in how you work and feel.   For your wrists, hands, and fingers  Wrist stretch    With your hands in front of you, make gentle fists. Point your knuckles toward the floor and hold for a few seconds.    Straighten your fingers and point them down. Feel the stretch in your lower arms.    Slowly point your fingers up toward the ceiling. Hold for a few seconds.    Relax and gently shake your hands out.    Repeat all steps 3 times.  Arm shake    While standing or sitting, drop your arms to your sides.    Gently shake out your arms and hands for a few seconds. Relax. Repeat 3 times.    For your ankles and feet    While sitting, slowly rotate one foot at the ankle, 3 times clockwise, then 3 times counterclockwise.    Rotate the other foot 3 times in each direction.    Relax and repeat 3 times with each foot.    This exercise can be done while standing on one foot. Make sure to support yourself for balance.    If you feel pain while  performing these stretching exercises, please stop and consult your healthcare provider.   Lani last reviewed this educational content on 5/1/2018 2000-2021 [employer]. All rights reserved. This information is not intended as a substitute for professional medical care. Always follow your healthcare professional's instructions.

## 2021-07-27 NOTE — PROGRESS NOTES
SUBJECTIVE:   CC: Aiax Hester is an 60 year old woman who presents for preventive health visit.       Patient has been advised of split billing requirements and indicates understanding: Yes  Healthy Habits:  Answers for HPI/ROS submitted by the patient on 7/20/2021  Frequency of exercise:: 6-7 days/week  Getting at least 3 servings of Calcium per day:: Yes  Diet:: Gluten-free/reduced  Taking medications regularly:: Yes  Medication side effects:: Not applicable  Bi-annual eye exam:: Yes  Dental care twice a year:: Yes  Sleep apnea or symptoms of sleep apnea:: None  abdominal pain: No  Blood in stool: No  Blood in urine: No  chest pain: No  chills: No  congestion: No  constipation: No  cough: No  diarrhea: No  dizziness: No  ear pain: No  eye pain: No  nervous/anxious: Yes  fever: No  frequency: No  genital sores: No  headaches: No  hearing loss: No  heartburn: No  arthralgias: Yes  joint swelling: Yes  peripheral edema: No  mood changes: No  myalgias: No  nausea: No  dysuria: No  palpitations: No  Skin sensation changes: No  sore throat: No  urgency: No  rash: No  shortness of breath: No  visual disturbance: No  weakness: No  pelvic pain: No  vaginal bleeding: No  vaginal discharge: No  tenderness: No  breast mass: No  breast discharge: No  Additional concerns today:: Yes  Duration of exercise:: 30-45 minutes      - Normal colonoscopy 10/29/2012. No family history of colorectal cancer in first-degree relative. On q 10 year colonoscopy schedule and is wondering if she can complete FIT test when this is due to repeat.    - She notes that she has completed mammogram sin the past last was done at [facility] in [place] with normal findings. She is unsure of the date this was completed.      Today's PHQ-2 Score:   PHQ-2 ( 1999 [employer]) 7/27/2021 7/20/2021   Q1: Little interest or pleasure in doing things 0 0   Q2: Feeling down, depressed or hopeless 0 0   PHQ-2 Score 0 0   Q1: Little interest or pleasure in  doing things - Not at all   Q2: Feeling down, depressed or hopeless - Not at all   PHQ-2 Score - 0       Abuse: Current or Past(Physical, Sexual or Emotional)- No  Do you feel safe in your environment? Yes        Social History     Tobacco Use     Smoking status: Never Smoker     Smokeless tobacco: Never Used   Substance Use Topics     Alcohol use: Yes     Comment: seldom     If you drink alcohol do you typically have >3 drinks per day or >7 drinks per week? No                     Reviewed orders with patient.  Reviewed health maintenance and updated orders accordingly - Yes  Lab work is in process  Labs reviewed in EPIC  BP Readings from Last 3 Encounters:   21 100/60   20 93/59   20 106/64    Wt Readings from Last 3 Encounters:   21 62.8 kg (138 lb 6 oz)   20 61.1 kg (134 lb 12.8 oz)                  Patient Active Problem List   Diagnosis   (none) - all problems resolved or deleted     Past Surgical History:   Procedure Laterality Date     arm surgery Left      COLONOSCOPY       COLONOSCOPY  10/29/2012    Procedure: COLONOSCOPY;  Colonoscopy;  Surgeon: Tommy Alegre MD;  Location: [facility]       Social History     Tobacco Use     Smoking status: Never Smoker     Smokeless tobacco: Never Used   Substance Use Topics     Alcohol use: Yes     Comment: seldom     Family History   Problem Relation Age of Onset     Cerebral aneurysm Mother         at age 72     Lung Cancer Mother      Coronary Artery Disease Father         Atrial Fib,  at 87     Breast Cancer No family hx of      Colon Cancer No family hx of          Current Outpatient Medications   Medication Sig Dispense Refill     calcium-magnesium (CALMAG) 500-250 MG TABS per tablet Take 1 tablet by mouth 2 times daily       diclofenac (VOLTAREN) 1 % topical gel Apply 4 g topically 4 times daily 350 g 4     Fish Oil-Cholecalciferol (OMEGA-3 FISH OIL/VITAMIN D3 PO)        melatonin 5 MG tablet Take 5 mg by mouth nightly as needed  for sleep       Multiple Vitamins-Minerals (PRESERVISION AREDS 2 PO)        Allergies   Allergen Reactions     Nka [No Known Allergies]      No lab results found.       Breast CA Risk Assessment (FHS-7) 2021   Do you have a family history of breast, colon, or ovarian cancer? No / Unknown         Mammogram Screening: Recommended mammography every 1-2 years with patient discussion and risk factor consideration  Pertinent mammograms are reviewed under the imaging tab.    Pertinent mammograms are reviewed under the imaging tab.  History of abnormal Pap smear: NO - age 30-65 PAP every 5 years with negative HPV co-testing recommended     Reviewed and updated as needed this visit by clinical staff  Tobacco  Allergies  Meds  Problems  Med Hx  Surg Hx  Fam Hx  Soc Hx          Reviewed and updated as needed this visit by Provider  Tobacco  Allergies  Meds  Problems  Med Hx  Surg Hx  Fam Hx  Soc Hx         Past Medical History:   Diagnosis Date     Chronic right shoulder pain     from traumatic injury     Eczema       Past Surgical History:   Procedure Laterality Date     arm surgery Left      COLONOSCOPY       COLONOSCOPY  10/29/2012    Procedure: COLONOSCOPY;  Colonoscopy;  Surgeon: Tommy Alegre MD;  Location: [facility]     OB History    Para Term  AB Living   1 0 0 0 1 0   SAB TAB Ectopic Multiple Live Births   0 0 0 0 0      # Outcome Date GA Lbr Brad/2nd Weight Sex Delivery Anes PTL Lv   1 AB                ROS:  CONSTITUTIONAL: NEGATIVE for fever, chills, change in weight  INTEGUMENTARY/SKIN: NEGATIVE for worrisome rashes, moles or lesions  EYES: NEGATIVE for vision changes or irritation  ENT: NEGATIVE for ear, mouth and throat problems  RESP: NEGATIVE for significant cough or SOB  BREAST: NEGATIVE for masses, tenderness or discharge  CV: NEGATIVE for chest pain, palpitations or peripheral edema  GI: NEGATIVE for nausea, abdominal pain, heartburn, or change in bowel habits  :  "NEGATIVE for unusual urinary or vaginal symptoms. No vaginal bleeding.  MUSCULOSKELETAL: NEGATIVE for significant myalgia POSITIVE for arthralgia- R hand/ thumb  NEURO: NEGATIVE for weakness, dizziness or paresthesias  ENDOCRINE: NEGATIVE for temperature intolerance, skin/hair changes  HEME/ALLERGY/IMMUNE: NEGATIVE for bleeding problems  PSYCHIATRIC: NEGATIVE for changes in mood or affect     OBJECTIVE:   /60   Pulse 73   Temp 98.6  F (37  C) (Tympanic)   Resp 16   Ht 1.562 m (5' 1.5\")   Wt 62.8 kg (138 lb 6 oz)   LMP  (LMP Unknown)   SpO2 98%   Breastfeeding No   BMI 25.72 kg/m    EXAM:  GENERAL APPEARANCE: healthy, alert and no distress  EYES: Eyes grossly normal to inspection, PERRL and conjunctivae and sclerae normal  HENT: ear canals and TM's normal, nose and mouth without ulcers or lesions, oropharynx clear and oral mucous membranes moist  NECK: no adenopathy, no asymmetry, masses, or scars and thyroid normal to palpation  RESP: lungs clear to auscultation - no rales, rhonchi or wheezes  BREAST: deferred per guidelines- asymptomatic per pt  CV: regular rate and rhythm, normal S1 S2, no S3 or S4, no murmur, click or rub, no peripheral edema and peripheral pulses strong  ABDOMEN: soft, nontender, no hepatosplenomegaly, no masses and bowel sounds normal   (female): deferred- no concerns.   MS: no musculoskeletal defects are noted and gait is age appropriate without ataxia, no CVA tenderness  SKIN: no suspicious lesions or rashes  NEURO: Normal strength and tone, cranial nerves intact, sensory exam grossly normal, mentation intact and speech normal  PSYCH: mentation appears normal and affect normal/bright    Diagnostic Test Results:  Labs reviewed in Epic  See orders    ASSESSMENT/PLAN:       ICD-10-CM    1. Routine general medical examination at a health care facility  Z00.00    2. Multiple joint pain  M25.50 CBC with platelets and differential     Anti Nuclear Constanza IgG by IFA with Reflex     CRP, " "inflammation     ESR: Erythrocyte sedimentation rate     Lyme Disease Constanza with reflex to WB Serum     diclofenac (VOLTAREN) 1 % topical gel     Lyme Disease Constanza with reflex to WB Serum     ESR: Erythrocyte sedimentation rate     CRP, inflammation     Anti Nuclear Constanza IgG by IFA with Reflex     CBC with platelets and differential   3. Screening for diabetes mellitus  Z13.1 Glucose     Glucose   4. Screening for thyroid disorder  Z13.29 TSH with free T4 reflex     TSH with free T4 reflex   5. Lipid screening  Z13.220 Lipid panel reflex to direct LDL Non-fasting     Lipid panel reflex to direct LDL Non-fasting   6. Special screening for malignant neoplasms, colon  Z12.11 Fecal colorectal cancer screen (FIT)     Fecal colorectal cancer screen (FIT)   7. Encounter for screening mammogram for breast cancer  Z12.31 *MA Screening Digital Bilateral       Patient has been advised of split billing requirements and indicates understanding: Yes  COUNSELING:   Reviewed preventive health counseling, as reflected in patient instructions    Estimated body mass index is 25.72 kg/m  as calculated from the following:    Height as of this encounter: 1.562 m (5' 1.5\").    Weight as of this encounter: 62.8 kg (138 lb 6 oz).    Weight management plan: Discussed healthy diet and exercise guidelines    She reports that she has never smoked. She has never used smokeless tobacco.      Counseling Resources:  ATP IV Guidelines  Pooled Cohorts Equation Calculator  Breast Cancer Risk Calculator  BRCA-Related Cancer Risk Assessment: FHS-7 Tool  FRAX Risk Assessment  ICSI Preventive Guidelines  Dietary Guidelines for Americans, 2010  USDA's MyPlate  ASA Prophylaxis  Lung CA Screening    GREGORIA Stark  [facility]  "

## 2021-07-29 ENCOUNTER — MYC MEDICAL ADVICE (OUTPATIENT)
Dept: FAMILY MEDICINE | Facility: CLINIC | Age: 61
End: 2021-07-29

## 2021-07-29 LAB
ANA SER QL IF: NEGATIVE
B BURGDOR IGG+IGM SER QL: 0.05

## 2021-07-30 NOTE — RESULT ENCOUNTER NOTE
Aron Kruse,    Thank you for your recent office visit.    Here are your recent results.  Your labs look pretty good. Your thyroid is slightly elevated, but we usually do not start patients on thyroid medication until TSH is greater than 10; or if patient is severely symptomatic at a lower level. Cholesterol is considered normal as you are not a diabetic (we want LDL < 190 in non-diabetics, less than 70 in diabetics).    Feel free to contact me via [employer] or call the clinic at [phone].    Sincerely,    ОЛЕГ Huitron, FNP-BC

## 2021-08-09 ENCOUNTER — ANCILLARY PROCEDURE (OUTPATIENT)
Dept: MAMMOGRAPHY | Facility: CLINIC | Age: 61
End: 2021-08-09
Attending: NURSE PRACTITIONER
Payer: COMMERCIAL

## 2021-08-09 DIAGNOSIS — Z12.31 ENCOUNTER FOR SCREENING MAMMOGRAM FOR BREAST CANCER: ICD-10-CM

## 2021-08-09 PROCEDURE — 77067 SCR MAMMO BI INCL CAD: CPT | Mod: TC | Performed by: RADIOLOGY

## 2021-08-12 ENCOUNTER — LAB (OUTPATIENT)
Dept: LAB | Facility: CLINIC | Age: 61
End: 2021-08-12
Payer: COMMERCIAL

## 2021-08-12 DIAGNOSIS — Z12.11 SPECIAL SCREENING FOR MALIGNANT NEOPLASMS, COLON: ICD-10-CM

## 2021-08-12 PROCEDURE — 82274 ASSAY TEST FOR BLOOD FECAL: CPT

## 2021-08-14 LAB — HEMOCCULT STL QL IA: NEGATIVE

## 2021-08-16 NOTE — RESULT ENCOUNTER NOTE
Arno Kruse,    Thank you for your recent office visit.    Here are your recent results.  Normal/ negative stool test    Feel free to contact me via [employer] or call the clinic at [phone].    Sincerely,    ОЛЕГ Huitron, FNP-BC

## 2021-09-11 ENCOUNTER — HEALTH MAINTENANCE LETTER (OUTPATIENT)
Age: 61
End: 2021-09-11

## 2021-11-06 ENCOUNTER — HEALTH MAINTENANCE LETTER (OUTPATIENT)
Age: 61
End: 2021-11-06

## 2021-12-14 ENCOUNTER — NURSE TRIAGE (OUTPATIENT)
Dept: PEDIATRICS | Facility: CLINIC | Age: 61
End: 2021-12-14

## 2021-12-14 NOTE — TELEPHONE ENCOUNTER
"Transferred to central scheduling to look at in person office visits today.  Also provided the patient information on [facility] orthopedics walk-in urgent care.     Ilda Ferrara RN   [facility]  -- Triage Nurse        Reason for Disposition    Followed an injury    SEVERE pain (e.g., excruciating)    Additional Information    Negative: Major bleeding (actively dripping or spurting) that can't be stopped    Negative: Amputation of toe    Negative: Sounds like a life-threatening emergency to the triager    Negative: Looks infected (e.g., spreading redness, pus, red streak)    Negative: High pressure injection injury (e.g., from paint gun, usually work-related    Negative: Looks like a broken bone or dislocated joint (e.g., crooked or deformed)    Negative: Skin is split open or gaping (length > 1/2 inch or 12 mm)    Negative: Bleeding won't stop after 10 minutes of direct pressure (using correct technique)    Negative: Dirt in the wound and not removed after 15 minutes of scrubbing    Negative: Sounds like a serious injury to the triager    Negative: Looks infected (e.g., spreading redness, pus, red streak)    Negative: Toenail is completely torn off    Negative: Base of toenail has popped out from under skin fold    Answer Assessment - Initial Assessment Questions  1. ONSET: \"When did the pain start?\"       Last night.   2. LOCATION: \"Where is the pain located?\"   (e.g., around nail, entire toe, at foot joint)       Right foot, toe next to pinky.   3. PAIN: \"How bad is the pain?\"    (Scale 1-10; or mild, moderate, severe)    -  MILD (1-3): doesn't interfere with normal activities     -  MODERATE (4-7): interferes with normal activities (e.g., work or school) or awakens from sleep, limping     -  SEVERE (8-10): excruciating pain, unable to do any normal activities, unable to walk      Moderate. Difficult to walk.   4. APPEARANCE: \"What does the toe look like?\" (e.g., redness, swelling, bruising, pallor)     " " Swollen and bruised.   5. CAUSE: \"What do you think is causing the toe pain?\"      Tripped on a vacuum  cord, Injured toe then.   6. OTHER SYMPTOMS: \"Do you have any other symptoms?\" (e.g., leg pain, rash, fever, numbness)      Lots of pain, difficult to walk.   7. PREGNANCY: \"Is there any chance you are pregnant?\" \"When was your last menstrual period?\"      NA    Answer Assessment - Initial Assessment Questions  1. MECHANISM: \"How did the injury happen?\"       Tripped on a vacuum  cord, injured toe then.   2. ONSET: \"When did the injury happen?\" (Minutes or hours ago)       Last night.   3. LOCATION: \"What part of the toe is injured?\" \"Is the nail damaged?\"       Right foot, toe next to pinky.   4. APPEARANCE of TOE INJURY: \"What does the injury look like?\"       Swollen and bruised.   5. SEVERITY: \"Can you use the foot normally?\" \"Can you walk?\"       Difficult to walk due to pain.   6. SIZE: For cuts, bruises, or swelling, ask: \"How large is it?\" (e.g., inches or centimeters;  entire toe)       Entire toe.   7. PAIN: \"Is there pain?\" If so, ask: \"How bad is the pain?\"   (e.g., Scale 1-10; or mild, moderate, severe)      Moderate pain.   8. TETANUS: For any breaks in the skin, ask: \"When was the last tetanus booster?\"      No breaks in the skin.   9. DIABETES: \"Do you have a history of diabetes or poor circulation in the feet?\"      No   10. OTHER SYMPTOMS: \"Do you have any other symptoms?\"         Just pain.   11. PREGNANCY: \"Is there any chance you are pregnant?\" \"When was your last menstrual period?\"        NA    Protocols used: TOE PAIN-A-OH, TOE INJURY-A-OH      "

## 2022-06-23 ENCOUNTER — NURSE TRIAGE (OUTPATIENT)
Dept: NURSING | Facility: CLINIC | Age: 62
End: 2022-06-23

## 2022-06-23 NOTE — TELEPHONE ENCOUNTER
"Patient feeling really tired today stating she feels her possible allergy symptoms kept her awake last night. Stated post nasal drip as well as throat pain when swallowing kept her awake. Patient stated she rinsed her mouth to moisten during the night which seemed to help.    Patient stated today she took a home covid test which was negative. Reports taking an OTC allergy pill today as well which \"helped a bit\".     Patient stated she decided not to go to work today due to feeling tired, stayed home to rest as needed however feels as though she could have attended work if needed.     Care Advice given per protocol. Patient confirmed understanding of advice.    Kathie Arenas RN on 6/23/2022 at 3:22 PM      Reason for Disposition    MILD weakness or fatigue with acute minor illness (e.g., colds)    Additional Information    Negative: Severe difficulty breathing (e.g., struggling for each breath, speaks in single words)    Negative: Shock suspected (e.g., cold/pale/clammy skin, too weak to stand, low BP, rapid pulse)    Negative: Difficult to awaken or acting confused (e.g., disoriented, slurred speech)    Negative: Fainted > 15 minutes ago and still feels too weak or dizzy to stand    Negative: SEVERE weakness (i.e., unable to walk or barely able to walk, requires support) and new onset or worsening    Negative: Sounds like a life-threatening emergency to the triager    Negative: Weakness of the face, arm or leg on one side of the body    Negative: Has diabetes and weakness from low blood sugar (i.e., < 60 mg/dL or 3.5 mmol/L)    Negative: Recent heat exposure, suspected cause of weakness    Negative: Vomiting is the main symptom    Negative: Diarrhea is the main symptom    Negative: Difficulty breathing    Negative: Heart beating < 50 beats per minute OR > 140 beats per minute    Negative: Extra heartbeats OR irregular heart beating (i.e., 'palpitations')    Negative: Follows bleeding (e.g., from vomiting, rectum, " vagina) (Exception: small transient weakness from sight of a small amount blood)    Negative: Bloody, black, or tarry bowel movements (Exception: chronic-unchanged  black-grey bowel movements and is taking iron pills or Pepto-bismol)    Negative: MODERATE weakness from poor fluid intake with no improvement after 2 hours of rest and fluids    Negative: Drinking very little and dehydration suspected (e.g., no urine > 12 hours, very dry mouth, very lightheaded)    Negative: Patient sounds very sick or weak to the triager    Negative: MODERATE weakness (i.e., interferes with work, school, normal activities) and cause unknown (Exceptions: weakness with acute minor illness, or weakness from poor fluid intake)    Negative: Fever > 103 F (39.4 C) and not able to get the Fever down using CARE ADVICE    Negative: Fever > 100.0 F (37.8 C) and bedridden (e.g., nursing home patient, stroke, chronic illness, recovering from surgery)    Negative: Fever > 101 F (38.3 C) and over 60 years of age    Negative: Fever > 100.0 F (37.8 C) and diabetes mellitus or weak immune system (e.g., HIV positive, cancer chemo, splenectomy, organ transplant, chronic steroids)    Negative: Pale skin (pallor)    Negative: MODERATE weakness (i.e., interferes with work, school, normal activities) and persists > 3 days    Negative: Taking a medicine that could cause weakness (e.g., blood pressure medications, diuretics)    Negative: Patient wants to be seen    Negative: MILD weakness (i.e., does not interfere with ability to work, go to school, normal activities) and persists > 1 week    Negative: Fatigue (i.e., tires easily, decreased energy) and persists > 1 week    Negative: Weakness is a chronic symptom (recurrent or ongoing AND lasting > 4 weeks)    Negative: Fatigue is a chronic symptom (recurrent or ongoing AND present > 4 weeks)    Protocols used: WEAKNESS (GENERALIZED) AND FATIGUE-A-OH

## 2022-08-10 ASSESSMENT — ENCOUNTER SYMPTOMS
NERVOUS/ANXIOUS: 0
ABDOMINAL PAIN: 0
FEVER: 0
HEADACHES: 0
SORE THROAT: 0
EYE PAIN: 0
CONSTIPATION: 0
SHORTNESS OF BREATH: 0
PARESTHESIAS: 0
COUGH: 0
PALPITATIONS: 0
DIARRHEA: 0
HEARTBURN: 0
MYALGIAS: 1
NAUSEA: 0
CHILLS: 0
HEMATOCHEZIA: 0
WEAKNESS: 0
DYSURIA: 0
FREQUENCY: 0
JOINT SWELLING: 0
HEMATURIA: 0
ARTHRALGIAS: 1
DIZZINESS: 0
BREAST MASS: 0

## 2022-08-17 ENCOUNTER — OFFICE VISIT (OUTPATIENT)
Dept: FAMILY MEDICINE | Facility: CLINIC | Age: 62
End: 2022-08-17
Payer: COMMERCIAL

## 2022-08-17 VITALS
SYSTOLIC BLOOD PRESSURE: 122 MMHG | WEIGHT: 134 LBS | DIASTOLIC BLOOD PRESSURE: 62 MMHG | HEART RATE: 68 BPM | OXYGEN SATURATION: 98 % | BODY MASS INDEX: 24.66 KG/M2 | HEIGHT: 62 IN | TEMPERATURE: 98.2 F | RESPIRATION RATE: 15 BRPM

## 2022-08-17 DIAGNOSIS — Z12.11 SCREEN FOR COLON CANCER: ICD-10-CM

## 2022-08-17 DIAGNOSIS — Z13.1 SCREENING FOR DIABETES MELLITUS: ICD-10-CM

## 2022-08-17 DIAGNOSIS — Z78.0 POST-MENOPAUSAL: Primary | ICD-10-CM

## 2022-08-17 DIAGNOSIS — Z11.4 SCREENING FOR HIV (HUMAN IMMUNODEFICIENCY VIRUS): ICD-10-CM

## 2022-08-17 DIAGNOSIS — Z00.00 ROUTINE GENERAL MEDICAL EXAMINATION AT A HEALTH CARE FACILITY: ICD-10-CM

## 2022-08-17 DIAGNOSIS — Z11.59 NEED FOR HEPATITIS C SCREENING TEST: ICD-10-CM

## 2022-08-17 DIAGNOSIS — E78.5 HYPERLIPIDEMIA LDL GOAL <100: ICD-10-CM

## 2022-08-17 LAB
CHOLEST SERPL-MCNC: 209 MG/DL
FASTING STATUS PATIENT QL REPORTED: NO
FASTING STATUS PATIENT QL REPORTED: NO
GLUCOSE BLD-MCNC: 99 MG/DL (ref 70–99)
HDLC SERPL-MCNC: 80 MG/DL
LDLC SERPL CALC-MCNC: 114 MG/DL
NONHDLC SERPL-MCNC: 129 MG/DL
TRIGL SERPL-MCNC: 73 MG/DL

## 2022-08-17 PROCEDURE — 36415 COLL VENOUS BLD VENIPUNCTURE: CPT | Performed by: NURSE PRACTITIONER

## 2022-08-17 PROCEDURE — 82947 ASSAY GLUCOSE BLOOD QUANT: CPT | Performed by: NURSE PRACTITIONER

## 2022-08-17 PROCEDURE — 80061 LIPID PANEL: CPT | Performed by: NURSE PRACTITIONER

## 2022-08-17 PROCEDURE — 87389 HIV-1 AG W/HIV-1&-2 AB AG IA: CPT | Performed by: NURSE PRACTITIONER

## 2022-08-17 PROCEDURE — 99396 PREV VISIT EST AGE 40-64: CPT | Mod: 25 | Performed by: NURSE PRACTITIONER

## 2022-08-17 PROCEDURE — 90471 IMMUNIZATION ADMIN: CPT | Performed by: NURSE PRACTITIONER

## 2022-08-17 PROCEDURE — 90715 TDAP VACCINE 7 YRS/> IM: CPT | Performed by: NURSE PRACTITIONER

## 2022-08-17 PROCEDURE — 82306 VITAMIN D 25 HYDROXY: CPT | Performed by: NURSE PRACTITIONER

## 2022-08-17 PROCEDURE — 86803 HEPATITIS C AB TEST: CPT | Performed by: NURSE PRACTITIONER

## 2022-08-17 ASSESSMENT — PAIN SCALES - GENERAL: PAINLEVEL: NO PAIN (0)

## 2022-08-17 ASSESSMENT — ENCOUNTER SYMPTOMS
NERVOUS/ANXIOUS: 0
DIARRHEA: 0
BREAST MASS: 0
FREQUENCY: 0
PARESTHESIAS: 0
SHORTNESS OF BREATH: 0
HEARTBURN: 0
ARTHRALGIAS: 1
CONSTIPATION: 0
PALPITATIONS: 0
MYALGIAS: 1
EYE PAIN: 0
SORE THROAT: 0
HEADACHES: 0
COUGH: 0
DYSURIA: 0
JOINT SWELLING: 0
HEMATOCHEZIA: 0
HEMATURIA: 0
ABDOMINAL PAIN: 0
DIZZINESS: 0
CHILLS: 0
NAUSEA: 0
FEVER: 0
WEAKNESS: 0

## 2022-08-17 NOTE — PATIENT INSTRUCTIONS
You should be taking 1200 mg of calcium carbonate per day with vitamin D.     You can call imaging scheduling to set up appointment date, time, and location that works best for you to have bone density test [phone]      Preventive Health Recommendations  Female Ages 50 - 64    Yearly exam: See your health care provider every year in order to  Review health changes.   Discuss preventive care.    Review your medicines if your doctor has prescribed any.    Get a Pap test every three years (unless you have an abnormal result and your provider advises testing more often).  If you get Pap tests with HPV test, you only need to test every 5 years, unless you have an abnormal result.   You do not need a Pap test if your uterus was removed (hysterectomy) and you have not had cancer.  You should be tested each year for STDs (sexually transmitted diseases) if you're at risk.   Have a mammogram every 1 to 2 years.  Have a colonoscopy at age 50, or have a yearly FIT test (stool test). These exams screen for colon cancer.    Have a cholesterol test every 5 years, or more often if advised.  Have a diabetes test (fasting glucose) every three years. If you are at risk for diabetes, you should have this test more often.   If you are at risk for osteoporosis (brittle bone disease), think about having a bone density scan (DEXA).    Shots: Get a flu shot each year. Get a tetanus shot every 10 years.    Nutrition:   Eat at least 5 servings of fruits and vegetables each day.  Eat whole-grain bread, whole-wheat pasta and brown rice instead of white grains and rice.  Get adequate Calcium and Vitamin D.     Lifestyle  Exercise at least 150 minutes a week (30 minutes a day, 5 days a week). This will help you control your weight and prevent disease.  Limit alcohol to one drink per day.  No smoking.   Wear sunscreen to prevent skin cancer.   See your dentist every six months for an exam and cleaning.  See your eye doctor every 1 to 2  years.

## 2022-08-17 NOTE — PROGRESS NOTES
SUBJECTIVE:   CC: Aixa Hester is an 61 year old woman who presents for preventive health visit.     Patient has been advised of split billing requirements and indicates understanding: Yes     Healthy Habits:     Getting at least 3 servings of Calcium per day:  Yes    Bi-annual eye exam:  Yes    Dental care twice a year:  Yes    Sleep apnea or symptoms of sleep apnea:  Daytime drowsiness    Diet:  Gluten-free/reduced    Frequency of exercise:  6-7 days/week    Duration of exercise:  30-45 minutes    Taking medications regularly:  Yes    Medication side effects:  Not applicable    PHQ-2 Total Score: 0        Today's PHQ-2 Score:   PHQ-2 ( 1999 [employer]) 8/10/2022   Q1: Little interest or pleasure in doing things 0   Q2: Feeling down, depressed or hopeless 0   PHQ-2 Score 0   PHQ-2 Total Score (12-17 Years)- Positive if 3 or more points; Administer PHQ-A if positive -   Q1: Little interest or pleasure in doing things Not at all   Q2: Feeling down, depressed or hopeless Not at all   PHQ-2 Score 0       Abuse: Current or Past (Physical, Sexual or Emotional) - No  Do you feel safe in your environment? Yes        Social History     Tobacco Use     Smoking status: Never Smoker     Smokeless tobacco: Never Used   Substance Use Topics     Alcohol use: Yes     Comment: seldom     If you drink alcohol do you typically have >3 drinks per day or >7 drinks per week? No    Alcohol Use 8/17/2022   Prescreen: >3 drinks/day or >7 drinks/week? -   Prescreen: >3 drinks/day or >7 drinks/week? No       Reviewed orders with patient.  Reviewed health maintenance and updated orders accordingly - Yes  BP Readings from Last 3 Encounters:   08/17/22 122/62   07/27/21 100/60   08/19/20 93/59    Wt Readings from Last 3 Encounters:   08/17/22 60.8 kg (134 lb)   07/27/21 62.8 kg (138 lb 6 oz)   02/12/20 61.1 kg (134 lb 12.8 oz)               Breast Cancer Screening:    Breast CA Risk Assessment (FHS-7) 7/20/2021 8/9/2021 8/9/2021   Do you  have a family history of breast, colon, or ovarian cancer? No / Unknown No / Unknown No / Unknown         Mammogram Screening: Recommended mammography every 1-2 years with patient discussion and risk factor consideration  Pertinent mammograms are reviewed under the imaging tab.    History of abnormal Pap smear: NO - age 30-65 PAP every 5 years with negative HPV co-testing recommended     Reviewed and updated as needed this visit by clinical staff   Tobacco  Allergies  Meds   Med Hx  Surg Hx  Fam Hx  Soc Hx       Ally DeShong CMA    Reviewed and updated as needed this visit by Provider                        Here today for physical.  Is not fasting-had oatmeal this morning for breakfast.    Is currently taking over-the-counter calcium supplement 1 tablet daily.  Has inhaled a calcium tablet in the past so is currently using a calcium chew.  Does take over-the-counter vitamin D 125 mcg orally daily.      Last Pap: 2019 normal pap, neg HPV. Never an abnormal.   Last mammogram: Having tomorrow. No family history   Last BMD: Never  Last Colonoscopy: 2012-normal. No family history.   Last eye exam: yearly  Last dental exam: every 6 mo  Last tetanus vaccine: 2010  Last influenza vaccine: Plans to get in the fall   Last shingles vaccine: has had bone   Last pneumonia vaccine: N/A  Last COVID vaccine: Has had both doses   Last COVID booster: has had 1 dose, plans to schedule for 2nd  Hep C screen: Plans to do here today  HIV screen: Declines, low risk   AAA screen (age 65-78 with smoking hx): N/A  IVD (HTN, Hyperlipid, Smoking): N/A  Lung CA screening (50-77, 20 pk smoking hx) Quit within 15 years: N/A      Review of Systems   Constitutional: Negative for chills and fever.   HENT: Negative for congestion, ear pain, hearing loss and sore throat.    Eyes: Negative for pain and visual disturbance.   Respiratory: Negative for cough and shortness of breath.    Cardiovascular: Negative for chest pain, palpitations and  "peripheral edema.   Gastrointestinal: Negative for abdominal pain, constipation, diarrhea, heartburn, hematochezia and nausea.   Breasts:  Negative for tenderness, breast mass and discharge.   Genitourinary: Negative for dysuria, frequency, genital sores, hematuria, pelvic pain, urgency, vaginal bleeding and vaginal discharge.   Musculoskeletal: Positive for arthralgias and myalgias. Negative for joint swelling.   Skin: Negative for rash.   Neurological: Negative for dizziness, weakness, headaches and paresthesias.   Psychiatric/Behavioral: Negative for mood changes. The patient is not nervous/anxious.         OBJECTIVE:   /62   Pulse 68   Temp 98.2  F (36.8  C) (Tympanic)   Resp 15   Ht 1.57 m (5' 1.81\")   Wt 60.8 kg (134 lb)   LMP  (LMP Unknown)   SpO2 98%   Breastfeeding No   BMI 24.66 kg/m    Physical Exam  Constitutional:       Appearance: Normal appearance. She is well-developed.   HENT:      Head: Normocephalic and atraumatic.      Right Ear: Tympanic membrane and external ear normal. No middle ear effusion.      Left Ear: Tympanic membrane and external ear normal.  No middle ear effusion.      Nose: No mucosal edema.      Mouth/Throat:      Pharynx: Uvula midline.   Eyes:      Pupils: Pupils are equal, round, and reactive to light.   Neck:      Thyroid: No thyromegaly.      Vascular: No carotid bruit.   Cardiovascular:      Rate and Rhythm: Normal rate and regular rhythm.      Pulses:           Femoral pulses are 2+ on the right side and 2+ on the left side.     Heart sounds: Normal heart sounds.   Pulmonary:      Effort: Pulmonary effort is normal.      Breath sounds: Normal breath sounds.   Abdominal:      General: Bowel sounds are normal.      Palpations: Abdomen is soft.      Tenderness: There is no abdominal tenderness.   Musculoskeletal:         General: Normal range of motion.      Cervical back: Normal range of motion.   Skin:     General: Skin is warm and dry.      Findings: No rash. " "  Neurological:      Mental Status: She is alert.   Psychiatric:         Behavior: Behavior normal.         ASSESSMENT/PLAN:   Routine general medical examination at a health care facility  Screening guidelines reviewed.     Screening for HIV (human immunodeficiency virus)  - HIV Antigen Antibody Combo; Future  - HIV Antigen Antibody Combo    Need for hepatitis C screening test  - Hepatitis C Screen Reflex to HCV RNA Quant and Genotype; Future  - Hepatitis C Screen Reflex to HCV RNA Quant and Genotype    Post-menopausal  Order placed, plans to call per self and set up  - Vitamin D Deficiency; Future  - DX Hip/Pelvis/Spine; Future  - Vitamin D Deficiency    Screen for colon cancer  Previous colonoscopy reviewed.  Order placed for Cologuard  - COLOGUARD([employer])    Hyperlipidemia LDL goal <100  Previous lipids reviewed.  We will recheck lipids here today, of note is not fasting.  - Lipid panel reflex to direct LDL Fasting; Future  - Lipid panel reflex to direct LDL Fasting    Screening for diabetes mellitus  - Glucose; Future  - Glucose    Patient has been advised of split billing requirements and indicates understanding: Yes    COUNSELING:  Reviewed preventive health counseling, as reflected in patient instructions       Regular exercise       Healthy diet/nutrition       Vision screening       Immunizations    Vaccinated for: TDAP plans to return for COVID booster.           Osteoporosis prevention/bone health       Colorectal Cancer Screening       Consider Hep C screening for all patients one time for ages 18-79 years       HIV screeninx in teen years, 1x in adult years, and at intervals if high risk    Estimated body mass index is 24.66 kg/m  as calculated from the following:    Height as of this encounter: 1.57 m (5' 1.81\").    Weight as of this encounter: 60.8 kg (134 lb).        She reports that she has never smoked. She has never used smokeless tobacco.      Counseling Resources:  ATP IV " Guidelines  Pooled Cohorts Equation Calculator  Breast Cancer Risk Calculator  BRCA-Related Cancer Risk Assessment: FHS-7 Tool  FRAX Risk Assessment  ICSI Preventive Guidelines  Dietary Guidelines for Americans, 2010  USDA's MyPlate  ASA Prophylaxis  Lung CA Screening    ОЛЕГ Owens CNP  [facility] EJNNIFER

## 2022-08-18 ENCOUNTER — ANCILLARY PROCEDURE (OUTPATIENT)
Dept: MAMMOGRAPHY | Facility: CLINIC | Age: 62
End: 2022-08-18
Attending: NURSE PRACTITIONER
Payer: COMMERCIAL

## 2022-08-18 DIAGNOSIS — Z12.31 VISIT FOR SCREENING MAMMOGRAM: ICD-10-CM

## 2022-08-18 LAB
DEPRECATED CALCIDIOL+CALCIFEROL SERPL-MC: 81 UG/L (ref 20–75)
HCV AB SERPL QL IA: NONREACTIVE
HIV 1+2 AB+HIV1 P24 AG SERPL QL IA: NONREACTIVE

## 2022-08-18 PROCEDURE — 77067 SCR MAMMO BI INCL CAD: CPT | Mod: TC | Performed by: RADIOLOGY

## 2022-08-22 ENCOUNTER — OFFICE VISIT (OUTPATIENT)
Dept: FAMILY MEDICINE | Facility: CLINIC | Age: 62
End: 2022-08-22
Payer: COMMERCIAL

## 2022-08-22 VITALS
RESPIRATION RATE: 20 BRPM | TEMPERATURE: 98 F | WEIGHT: 135.38 LBS | HEART RATE: 64 BPM | SYSTOLIC BLOOD PRESSURE: 98 MMHG | DIASTOLIC BLOOD PRESSURE: 64 MMHG | BODY MASS INDEX: 24.91 KG/M2 | HEIGHT: 62 IN | OXYGEN SATURATION: 98 %

## 2022-08-22 DIAGNOSIS — M54.41 ACUTE RIGHT-SIDED LOW BACK PAIN WITH RIGHT-SIDED SCIATICA: Primary | ICD-10-CM

## 2022-08-22 PROCEDURE — 99213 OFFICE O/P EST LOW 20 MIN: CPT | Performed by: PHYSICIAN ASSISTANT

## 2022-08-22 RX ORDER — CYCLOBENZAPRINE HCL 5 MG
2.5-5 TABLET ORAL
Qty: 15 TABLET | Refills: 0 | Status: SHIPPED | OUTPATIENT
Start: 2022-08-22 | End: 2022-09-15

## 2022-08-22 ASSESSMENT — PAIN SCALES - GENERAL: PAINLEVEL: NO PAIN (0)

## 2022-08-22 NOTE — PATIENT INSTRUCTIONS
Gasper Kruse,    Thank you for allowing [facility] to manage your care.    If you develop worsening/changing symptoms at any time, please call 911 or go to the emergency department for evaluation.    I sent your prescriptions to your pharmacy.    For your pain, please use Tylenol 650mg every 6 hours. You may use 400mg of ibuprofen between doses of Tylenol.     Max acetaminophen (Tylenol) 3,000mg/24 hours  Max ibuprofen 2,000mg/24 hours    For severe pain not controlled by over the counter medications, please use cyclobenzaprine as prescribed. Do not use this medication while driving, operating machinery, with other sedating medications, or while drinking alcohol as it will make you drowsy.    I made a physical therapy referral, they will be calling in approximately 1 week to set up your appointment.  If you do not hear from them, please call the specialty number on your after visit summary.     If you have any questions or concerns, please feel free to call us at [phone]    Sincerely,    Ryder Urbano PA-C    Did you know?      You can schedule a video visit for follow-up appointments as well as future appointments for certain conditions.  Please see the below link.     [email or web address]    If you have not already done so,  I encourage you to sign up for [email or web address] ([email or web address]).  This will allow you to review your results, securely communicate with a provider, and schedule virtual visits as well.

## 2022-08-22 NOTE — PROGRESS NOTES
Assessment & Plan   Problem List Items Addressed This Visit    None     Visit Diagnoses     Acute right-sided low back pain with right-sided sciatica    -  Primary    Relevant Medications    cyclobenzaprine (FLEXERIL) 5 MG tablet    Other Relevant Orders    Physical Therapy Referral         I do not believe a fracture to be the source of this patient's pain as there was no preceding trauma.  Based on this, no imaging of the spine was done.    I do not believe the pain is caused by an epidural abscess as the patient denies hx of IV drug use and does not have fevers/chills and no recent procedures.    I do not believe this patient's pain is from an infiltrative vertebral tumor as the patient does not have weight loss or night sweats and no known history of cancer.    I do not believe this patient's pain represents a rupture AAA.  There is no palpable, pulsatile mass on exam and patient does not have any ABD pain.      Based on history and exam, the most likely etiology of this patient's pain is lumbosacral radiculopathy.  Emergent MRI is not indicated as this patient does not have new weakness or cauda equina syndrome.  Patient has no saddle anesthesia, bowel or bladder incontinence. Will send home with otc analgesics,  muscle relaxant for breakthrough pain/spasm, rice/heat, and physical therapy referral.  Follow-up in 1 month if not improving.    Prescription drug management  21 minutes spent on the date of the encounter doing chart review, history and exam, documentation and further activities per the note     See Patient Instructions    Return in about 1 month (around 9/22/2022) for a recheck of your symptoms if not improving, or call 911/go to an ER anytime if worsening.    CLEMENTINE Friedman  [facility] JENNIFER Kruse is a 61 year old presenting for the following health issues:  Hospital F/U      [facility]     ED/UC Followup:    Facility:  [facility]   Date of visit: 8/21/22  Reason for  "visit: R sided knee pain   Current Status: Pt reports improvement but movement causes pain   Felt that she injured her left back one week ago and yesterday the pain traveled to her knee. Feels improved since ER visit last night. No numbness or tingling. No history of surgery to low back. No incontinence. Has been using lidocaine patches and otc analgesics.     Review of Systems   Constitutional, HEENT, cardiovascular, pulmonary, gi and gu systems are negative, except as otherwise noted.      Objective    BP 98/64   Pulse 64   Temp 98  F (36.7  C) (Tympanic)   Resp 20   Ht 1.562 m (5' 1.5\")   Wt 61.4 kg (135 lb 6 oz)   LMP  (LMP Unknown)   SpO2 98%   Breastfeeding No   BMI 25.16 kg/m    Body mass index is 25.16 kg/m .  Physical Exam  Vitals and nursing note reviewed.   Constitutional:       General: She is not in acute distress.     Appearance: She is not ill-appearing or diaphoretic.   HENT:      Head: Normocephalic and atraumatic.      Mouth/Throat:      Mouth: Mucous membranes are moist.   Eyes:      Conjunctiva/sclera: Conjunctivae normal.   Cardiovascular:      Rate and Rhythm: Normal rate and regular rhythm.      Heart sounds: Normal heart sounds. No murmur heard.    No friction rub. No gallop.   Pulmonary:      Effort: Pulmonary effort is normal. No respiratory distress.      Breath sounds: Normal breath sounds. No stridor. No wheezing, rhonchi or rales.   Abdominal:      General: Bowel sounds are normal. There is no distension.      Palpations: Abdomen is soft. There is no mass.      Tenderness: There is no abdominal tenderness. There is no guarding or rebound.      Hernia: No hernia is present.   Musculoskeletal:      Comments: No CVA or midline spinal tenderness. No overlying signs of trauma or infection.   Skin:     General: Skin is warm and dry.   Neurological:      General: No focal deficit present.      Mental Status: She is alert. Mental status is at baseline.      Comments: Able to toe lift, " heel walk and knee bend.   Psychiatric:         Mood and Affect: Mood normal.         Behavior: Behavior normal.                        .  ..

## 2022-08-23 ENCOUNTER — THERAPY VISIT (OUTPATIENT)
Dept: PHYSICAL THERAPY | Facility: CLINIC | Age: 62
End: 2022-08-23
Attending: PHYSICIAN ASSISTANT
Payer: COMMERCIAL

## 2022-08-23 DIAGNOSIS — M54.41 ACUTE RIGHT-SIDED LOW BACK PAIN WITH RIGHT-SIDED SCIATICA: ICD-10-CM

## 2022-08-23 PROCEDURE — 97161 PT EVAL LOW COMPLEX 20 MIN: CPT | Mod: GP | Performed by: PHYSICAL THERAPIST

## 2022-08-23 PROCEDURE — 97110 THERAPEUTIC EXERCISES: CPT | Mod: GP | Performed by: PHYSICAL THERAPIST
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
## 2022-09-06 ENCOUNTER — ANCILLARY PROCEDURE (OUTPATIENT)
Dept: BONE DENSITY | Facility: CLINIC | Age: 62
End: 2022-09-06
Attending: NURSE PRACTITIONER
Payer: COMMERCIAL

## 2022-09-06 DIAGNOSIS — Z78.0 POST-MENOPAUSAL: ICD-10-CM

## 2022-09-06 PROCEDURE — 77080 DXA BONE DENSITY AXIAL: CPT | Performed by: INTERNAL MEDICINE

## 2022-09-06 NOTE — LETTER
2022      Aixa Hester  [address]  [place] [address]        Aixa,   Your bone density test does show that you have osteoporosis-thin bones.  If you smoke please try and stop. You need to ensure that you are taking 1200 mg of Calcium Carbonate + D daily. Please try and do low impact exercises such as light weight lifting. Walking is also another great exercise to help increase bone strength.  Would also recommend that you make a virtual appointment to discuss other treatment options.       Resulted Orders   DX Hip/Pelvis/Spine    Narrative    BONE DENSITOMETRY  [address]  [place] [address]  2022      PATIENT: Aixa Hester  CHART: [id number]   :  1960  AGE:  62 year old  SEX:  female   REFERRING PROVIDER:  Kecia Fischer CNP     PROCEDURE:  Bone density scanning was performed using DXA technology of   the lumbar spine and hip.  Scanning was performed on a Lunar Prodigy   scanner.  Reporting is completed in the form of a T-score.  The T-score   represents the standard deviation from peak bone mass based on a young   healthy adult.     REFERENCE T-SCORES:       Normal                -1.0 and greater                                 Osteopenia         Between -1.0 and -2.5                                             Osteoporosis     -2.5 and less                                       RISK FACTORS:  Post-menopausal    CURRENT TREATMENT:  Calcium, Vitamin D     FINDINGS:               Lumbar Spine (L1-L2)      T-score:  -2.7,  marked   degenerative changes present at L3,4, so only L1,2 are evaluated.                Left Femoral Neck            T-score:  -2.5               Right Femoral Neck          T-score:  -2.5                               Lumbar (L1-L2) BMD: 0.843               Total Hip Mean BMD: 0.799    IMPRESSION  Osteoporosis.    Recommendations include ensuring adequate Calcium and Vitamin D.    The current NOF Guidelines recommend  "treatment for patients with prior hip   or vertebral fracture, T-score -2.5 or below, or 10 year risk of any major   osteoporotic fracture 20% or greater, or 10 year risk of hip fracture 3%   or greater as calculated using the FRAX calculator (www.shef.ac.uk/FRAX or   you can google \"FRAX\").      Based on these guidelines, treatment (in addition to calcium and vitamin   D) is recommended for this patient, after ruling out other causes of   osteoporosis.  This is meant as an aid to clinical decision making; one must still use   clinical judgement.    Follow up can be considered in 2 years.   ___________________  Rachna Peralta M.D.  Electronically signed                     If you have any questions or concerns, please call the clinic at the number listed above.       Sincerely,      ОЛЕГ Becker CNP          "

## 2022-09-08 ENCOUNTER — THERAPY VISIT (OUTPATIENT)
Dept: PHYSICAL THERAPY | Facility: CLINIC | Age: 62
End: 2022-09-08
Payer: COMMERCIAL

## 2022-09-08 DIAGNOSIS — M54.41 ACUTE RIGHT-SIDED LOW BACK PAIN WITH RIGHT-SIDED SCIATICA: Primary | ICD-10-CM

## 2022-09-08 PROCEDURE — 97110 THERAPEUTIC EXERCISES: CPT | Mod: GP | Performed by: PHYSICAL THERAPIST

## 2022-09-12 NOTE — RESULT ENCOUNTER NOTE
Aixa,  Your bone density test does show that you have osteoporosis-thin bones.  If you smoke please try and stop. You need to ensure that you are taking 1200 mg of Calcium Carbonate + D daily. Please try and do low impact exercises such as light weight lifting. Walking is also another great exercise to help increase bone strength.  Would also recommend that you make a virtual appointment to discuss other treatment options.    Kecia STRATTON

## 2022-09-15 ENCOUNTER — VIRTUAL VISIT (OUTPATIENT)
Dept: FAMILY MEDICINE | Facility: CLINIC | Age: 62
End: 2022-09-15
Payer: COMMERCIAL

## 2022-09-15 DIAGNOSIS — M81.0 AGE-RELATED OSTEOPOROSIS WITHOUT CURRENT PATHOLOGICAL FRACTURE: Primary | ICD-10-CM

## 2022-09-15 PROCEDURE — 99214 OFFICE O/P EST MOD 30 MIN: CPT | Mod: 95 | Performed by: NURSE PRACTITIONER

## 2022-09-15 RX ORDER — ALENDRONATE SODIUM 70 MG/1
70 TABLET ORAL
Qty: 12 TABLET | Refills: 3 | Status: SHIPPED | OUTPATIENT
Start: 2022-09-15 | End: 2023-07-25

## 2022-09-15 ASSESSMENT — ENCOUNTER SYMPTOMS: HEARTBURN: 1

## 2022-09-15 NOTE — PATIENT INSTRUCTIONS
Would recommend that you decrease your current dose of over-the-counter vitamin D3 to 4000 units / 100 mcg orally daily.     Please continue to take at least 1200 mg of calcium carbonate daily.    I have entered the lab orders.  Please make a lab only appointment for a date and time that works best for you.  I did send the prescription to the pharmacy for the Fosamax.

## 2022-09-15 NOTE — PROGRESS NOTES
Aixa is a 62 year old who is being evaluated via a billable video visit.      How would you like to obtain your AVS? MyChart  If the video visit is dropped, the invitation should be resent by: Text to cell phone: [phone]  Will anyone else be joining your video visit? No          Assessment & Plan     Age-related osteoporosis without current pathological fracture  Results reviewed with patient.  Discussed options-oral medication, subcu injections, IV infusion.  Wishes to proceed with oral medication.  Educated on use and possible side effects.  Continue over-the-counter calcium and vitamin D supplementation.  Will check additional labs to rule out alternative cause.  Recommend repeat bone density test in 2 to 3 years.  - Basic metabolic panel; Future  - Magnesium; Future  - Phosphorus; Future  - Parathyroid Hormone Intact; Future  - alendronate (FOSAMAX) 70 MG tablet; Take 1 tablet (70 mg) by mouth every 7 days    Review of the result(s) of each unique test - imaging   Ordering of each unique test  Prescription drug management  22 minutes spent on the date of the encounter doing chart review, history and exam, documentation and further activities per the note       No follow-ups on file.    ОЛЕГ Owens CNP  [facility] [place] is a 62 year old presenting for the following health issues:  Video Visit      HPI     *Discuss dexa scan results from 09/06/22    Video visit completed due to COVID 19.  Converted to phone call visit due to audio difficulty.    Had bone density test done September 6.  Needs to review results and treatment options.  Bone density test shows osteoporosis has been taking calcium for 25 years. Has never smoked. 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  Is very active and eats a well-balanced diet.    Does occasionally have heart burn/reflux occasionally and will take some Tums. Occurs a couple of times per week. Does have times that  will be more severe and will not eat for 1/2 day or so.      Review of Systems   Gastrointestinal: Positive for heartburn (occ).            Objective           Vitals:  No vitals were obtained today due to virtual visit.    You have Vitamin D deficiency. You need to start taking Vitamin D3 4000 units/100 mcg orally daily and plan to recheck your Vitamin D level again in 3 months.     Physical Exam   No PE completed, phone call visit.         Distant Location (provider location):  [facility] [place]       Phone call: 15 minutes

## 2022-09-28 LAB — NONINV COLON CA DNA+OCC BLD SCRN STL QL: NEGATIVE

## 2022-10-29 ENCOUNTER — HEALTH MAINTENANCE LETTER (OUTPATIENT)
Age: 62
End: 2022-10-29

## 2023-01-18 ENCOUNTER — MYC MEDICAL ADVICE (OUTPATIENT)
Dept: FAMILY MEDICINE | Facility: CLINIC | Age: 63
End: 2023-01-18
Payer: COMMERCIAL

## 2023-01-18 NOTE — TELEPHONE ENCOUNTER
RN reviewed patient chart, appears patient would not qualify for treatment though the RN protocol.     Nurse called patient to verify problem list, medications and weight, left a message to return call or read [employer] message.     Beatriz Yung RN

## 2023-01-18 NOTE — TELEPHONE ENCOUNTER
RN COVID TREATMENT VISIT  01/18/23    Aixa Hester  62 year old  Current weight? 135    Has the patient been seen by a primary care provider at an [facility] or [facility] within the past two years? Yes.   Have you been in close proximity to/do you have a known exposure to a person with a confirmed case of influenza? No.     Date of positive COVID test (PCR or at home)?  1/17/23    Current COVID symptoms: muscle or body aches, new loss of taste or smell and congestion or runny nose    Date COVID symptoms began: 1/16/23    Do you have any of the following conditions that place you at risk of being very sick from COVID-19? No high risk conditions    Is patient eligible to continue? No, patient is not at risk for being very sick from COVID-19 and informed he/she does not qualify for treatment. Patient did stated that she has only minimal symptoms.  I advised to call back if any further concerns or symptoms.  Patient also declined a visit with a provider.   Evon Palma RN

## 2023-01-25 ENCOUNTER — NURSE TRIAGE (OUTPATIENT)
Dept: NURSING | Facility: CLINIC | Age: 63
End: 2023-01-25
Payer: COMMERCIAL

## 2023-01-25 NOTE — TELEPHONE ENCOUNTER
Patient was diagnosed with covid-19 on 1/17. She reports that symptoms are improving, she still has some congestion and yellow nasal discharge. Patient is wondering if she is still contagious and what the recommendation is being around other.    Home care per protocol, advise on quarantine for 5 days and then mask around others for an additional 5 days. Patient verbalizes understanding and denies further questions at this time.    Anum Isidro RN  [facility]      Reason for Disposition    COVID-19 Home Isolation, questions about    [1] COVID-19 diagnosed by positive lab test (e.g., PCR, rapid self-test kit) AND [2] mild symptoms (e.g., cough, fever, others) AND [3] no complications or SOB    Additional Information    Negative: [1] COVID-19 diagnosed by doctor (or NP/PA) AND [2] mild symptoms (e.g., cough, fever, others) AND [3] no complications or SOB    Negative: [1] COVID-19 diagnosed AND [2] has mild nausea, vomiting or diarrhea    Negative: [1] COVID-19 infection suspected by caller or triager AND [2] mild symptoms (cough, fever, or others) AND [3] has not gotten tested yet    Negative: [1] COVID-19 diagnosed by positive lab test (e.g., PCR, rapid self-test kit) AND [2] NO symptoms (e.g., cough, fever, others)    Negative: [1] COVID-19 infection suspected by caller or triager AND [2] mild symptoms (cough, fever, or others) AND [3] negative COVID-19 rapid test    Negative: Fever present > 3 days (72 hours)    Negative: [1] Fever returns after gone for over 24 hours AND [2] symptoms worse or not improved    Negative: [1] Continuous (nonstop) coughing interferes with work or school AND [2] no improvement using cough treatment per Care Advice    Negative: Cough present > 3 weeks    Negative: MILD difficulty breathing (e.g., minimal/no SOB at rest, SOB with walking, pulse <100)    Negative: Fever > 103 F (39.4 C)    Negative: [1] Fever > 101 F (38.3 C) AND [2] over 60 years of age     Negative: [1] Fever > 100.0 F (37.8 C) AND [2] bedridden (e.g., nursing home patient, CVA, chronic illness, recovering from surgery)    Negative: [1] HIGH RISK for severe COVID complications (e.g., weak immune system, age > 64 years, obesity with BMI of 30 or higher, pregnant, chronic lung disease or other chronic medical condition) AND [2] COVID symptoms (e.g., cough, fever)  (Exceptions: Already seen by PCP and no new or worsening symptoms.)    Negative: [1] HIGH RISK patient AND [2] influenza is widespread in the community AND [3] ONE OR MORE respiratory symptoms: cough, sore throat, runny or stuffy nose    Negative: [1] HIGH RISK patient AND [2] influenza exposure within the last 7 days AND [3] ONE OR MORE respiratory symptoms: cough, sore throat, runny or stuffy nose    Negative: Oxygen level (e.g., pulse oximetry) 91 to 94 percent    Negative: Chest pain or pressure  (Exception: MILD central chest pain, present only when coughing)    Negative: Patient sounds very sick or weak to the triager    Negative: SEVERE or constant chest pain or pressure  (Exception: Mild central chest pain, present only when coughing.)    Negative: MODERATE difficulty breathing (e.g., speaks in phrases, SOB even at rest, pulse 100-120)    Negative: Headache and stiff neck (can't touch chin to chest)    Negative: Oxygen level (e.g., pulse oximetry) 90 percent or lower    Negative: [1] Diagnosed or suspected COVID-19 AND [2] symptoms lasting 3 or more weeks    Negative: [1] COVID-19 exposure AND [2] no symptoms    Negative: COVID-19 vaccine reaction suspected (e.g., fever, headache, muscle aches) occurring 1 to 3 days after getting vaccine    Negative: COVID-19 vaccine, questions about    Negative: [1] Lives with someone known to have influenza (flu test positive) AND [2] flu-like symptoms (e.g., cough, runny nose, sore throat, SOB; with or without fever)    Negative: [1] Adult with possible COVID-19 symptoms AND [2] triager concerned  about severity of symptoms or other causes    Negative: COVID-19 and breastfeeding, questions about    Negative: SEVERE difficulty breathing (e.g., struggling for each breath, speaks in single words)    Negative: Difficult to awaken or acting confused (e.g., disoriented, slurred speech)    Negative: Bluish (or gray) lips or face now    Negative: Shock suspected (e.g., cold/pale/clammy skin, too weak to stand, low BP, rapid pulse)    Negative: Sounds like a life-threatening emergency to the triager    Protocols used: CORONAVIRUS (COVID-19) DIAGNOSED OR [id number]

## 2023-03-14 ENCOUNTER — THERAPY VISIT (OUTPATIENT)
Dept: PHYSICAL THERAPY | Facility: CLINIC | Age: 63
End: 2023-03-14
Payer: COMMERCIAL

## 2023-03-14 DIAGNOSIS — M25.512 SHOULDER PAIN, LEFT: ICD-10-CM

## 2023-03-14 PROCEDURE — 97110 THERAPEUTIC EXERCISES: CPT | Mod: GP | Performed by: PHYSICAL THERAPIST

## 2023-03-14 PROCEDURE — 97161 PT EVAL LOW COMPLEX 20 MIN: CPT | Mod: GP | Performed by: PHYSICAL THERAPIST

## 2023-03-14 NOTE — PROGRESS NOTES
Physical Therapy Initial Evaluation  Subjective:  The history is provided by the patient. No  was used.   Patient Health History  Aixa Hester being seen for Left shoulder/ upper arm weakness/pain.     Problem began: 11/1/2022.   Problem occurred: Not sure, it just started being a problem and I didn t really pay attention to when it was.   Pain is reported as 1/10 on pain scale.  General health as reported by patient is good.  Pertinent medical history includes: osteoporosis.   Red flags:  None as reported by patient.     Surgeries include:  Orthopedic surgery.    Current medications:  Bone density.    Current occupation is , ,  assistant.   Primary job tasks include:  Prolonged sitting, prolonged standing and repetitive tasks.                  Therapist Generated HPI Evaluation         Type of problem:  Left shoulder.    This is a new condition.  Condition occurred with:  Unknown cause.  Where condition occurred: for unknown reasons.  Patient reports pain:  Anterior, lateral and upper arm.  Pain is described as aching and is intermittent.    Since onset symptoms are gradually improving.  Associated with: weakness is bothersome more than anything. Exacerbated by: taking off her shirt, rotating the arm.  Relieved by: Previous PT exercises helped.      Restrictions due to condition include:  Working in normal job without restrictions.  Barriers include:  None as reported by patient.                        Objective:  System                   Shoulder Evaluation:  ROM:        Endfeel: full AROM all directions however firm end feel with pain with L shoulder ER, flexion and abduction.  Strength:    Flexion: Left:3/5 Weak/painful    Pain:    Right: 4+/5  Strong/pain free     Pain:   Extension:  Left: 4/5  Strong/pain free    Pain:    Right: 3/5    Weak/painful  Pain:  Abduction:  Right: 4/5   Strong/pain free    Pain:  Adduction:  Left: 3/5   Weak/painful   Pain:         External Rotation:   Left:4-/5   Strong/pain free    Pain:   Right:4+/5   Strong/pain free    Pain:                                                     General     ROS    Assessment/Plan:    Patient is a 62 year old female with left side shoulder complaints.    Patient has the following significant findings with corresponding treatment plan.                Diagnosis 1:  L Shoulder Pain  Pain -  self management, education and home program  Decreased ROM/flexibility - manual therapy and therapeutic exercise  Decreased joint mobility - manual therapy and therapeutic exercise  Decreased strength - therapeutic exercise and therapeutic activities    Therapy Evaluation Codes:   1) History comprised of:   Personal factors that impact the plan of care:      None.    Comorbidity factors that impact the plan of care are:      None.     Medications impacting care: None.  2) Examination of Body Systems comprised of:   Body structures and functions that impact the plan of care:      L Shoulder.   Activity limitations that impact the plan of care are:      reaching, lifting, dressing, working.  3) Clinical presentation characteristics are:   Stable/Uncomplicated.  4) Decision-Making    Low complexity using standardized patient assessment instrument and/or measureable assessment of functional outcome.  Cumulative Therapy Evaluation is: Low complexity.    Previous and current functional limitations:  (See Goal Flow Sheet for this information)    Short term and Long term goals: (See Goal Flow Sheet for this information)     Communication ability:  Patient appears to be able to clearly communicate and understand verbal and written communication and follow directions correctly.  Treatment Explanation - The following has been discussed with the patient:   RX ordered/plan of care  Anticipated outcomes  Possible risks and side effects  This patient would benefit from PT intervention to resume normal activities.   Rehab potential is  excellent.    Frequency:  1 X week, once daily  Duration:  for 6 weeks  Discharge Plan:  Achieve all LTG.  Independent in home treatment program.  Reach maximal therapeutic benefit.    Please refer to the daily flowsheet for treatment today, total treatment time and time spent performing 1:1 timed codes.

## 2023-03-22 ENCOUNTER — THERAPY VISIT (OUTPATIENT)
Dept: PHYSICAL THERAPY | Facility: CLINIC | Age: 63
End: 2023-03-22
Payer: COMMERCIAL

## 2023-03-22 DIAGNOSIS — M25.512 SHOULDER PAIN, LEFT: Primary | ICD-10-CM

## 2023-03-22 PROCEDURE — 97110 THERAPEUTIC EXERCISES: CPT | Mod: GP | Performed by: PHYSICAL THERAPIST

## 2023-04-05 ENCOUNTER — THERAPY VISIT (OUTPATIENT)
Dept: PHYSICAL THERAPY | Facility: CLINIC | Age: 63
End: 2023-04-05
Payer: COMMERCIAL

## 2023-04-05 DIAGNOSIS — M25.512 SHOULDER PAIN, LEFT: Primary | ICD-10-CM

## 2023-04-05 PROCEDURE — 97110 THERAPEUTIC EXERCISES: CPT | Mod: GP | Performed by: PHYSICAL THERAPIST

## 2023-04-06 ENCOUNTER — VIRTUAL VISIT (OUTPATIENT)
Dept: FAMILY MEDICINE | Facility: CLINIC | Age: 63
End: 2023-04-06
Payer: COMMERCIAL

## 2023-04-06 DIAGNOSIS — N39.0 URINARY TRACT INFECTION WITHOUT HEMATURIA, SITE UNSPECIFIED: Primary | ICD-10-CM

## 2023-04-06 PROCEDURE — 99213 OFFICE O/P EST LOW 20 MIN: CPT | Mod: 95 | Performed by: FAMILY MEDICINE

## 2023-04-06 RX ORDER — SULFAMETHOXAZOLE/TRIMETHOPRIM 800-160 MG
1 TABLET ORAL 2 TIMES DAILY
Qty: 6 TABLET | Refills: 0 | Status: SHIPPED | OUTPATIENT
Start: 2023-04-06 | End: 2023-04-09

## 2023-04-06 NOTE — PATIENT INSTRUCTIONS
I think this is a bladder infection, although it does not sound classic.    We will treat with antibiotics and see if there is improvement.    If there is no improvement within 6 to 7 days, or if symptoms worsen, please contact our clinic.

## 2023-04-06 NOTE — PROGRESS NOTES
"Aixa is a 62 year old who is being evaluated via a billable telephone visit.      What phone number would you like to be contacted at? [phone]  How would you like to obtain your AVS? [place]  Distant Location (provider location):  On-site    Assessment & Plan     (N39.0) Urinary tract infection without hematuria, site unspecified  (primary encounter diagnosis)  Comment: Somewhat atypical presentation, pain and irritation after wiping.  Overall, seems consistent with a lower tract infection.  We will treat with antibiotic and see if there is clinical improvement.  Plan: sulfamethoxazole-trimethoprim (BACTRIM DS)         800-160 MG tablet       Reviewed side effects of the medication.    Patient Instructions   I think this is a bladder infection, although it does not sound classic.    We will treat with antibiotics and see if there is improvement.    If there is no improvement within 6 to 7 days, or if symptoms worsen, please contact our clinic.       BMI:   Estimated body mass index is 25.16 kg/m  as calculated from the following:    Height as of 8/22/22: 1.562 m (5' 1.5\").    Weight as of 8/22/22: 61.4 kg (135 lb 6 oz).     Shazia Griffin MD  [facility] JENNIFER Kruse is a 62 year old, presenting for the following health issues:  Urinary Problem         View : No data to display.              HPI     Genitourinary - Female  Onset/Duration: x1 day.  Description:   Painful urination (Dysuria): YES           Frequency: NO  Blood in urine (Hematuria): No  Delay in urine (Hesitency): No  Intensity: moderate  Progression of Symptoms:  improving  Accompanying Signs & Symptoms:  Fever/chills: No  Flank pain: No  Nausea and vomiting: No  Vaginal symptoms: none  Abdominal/Pelvic Pain: YES  History:   History of frequent UTI s: No  History of kidney stones: No  Sexually Active: No  Possibility of pregnancy: No  Precipitating or alleviating factors: Drinking a lot of water makes it " better  Therapies tried and outcome:  none           Review of Systems   CONSTITUTIONAL: NEGATIVE for fever, chills, change in weight  ENT/MOUTH: NEGATIVE for ear, mouth and throat problems  RESP: NEGATIVE for significant cough or SOB  CV: NEGATIVE for chest pain, palpitations or peripheral edema  GI: NEGATIVE for nausea, abdominal pain, heartburn, or change in bowel habits.  Denies flank pain.      Objective           Vitals:  No vitals were obtained today due to virtual visit.    Physical Exam   healthy, alert and no distress  PSYCH: Alert  Her affect is normal  RESP: No cough, no audible wheezing, able to talk in full sentences  Remainder of exam unable to be completed due to telephone visits            Phone call duration: 6 minutes    Shazia Griffin MD

## 2023-07-18 ENCOUNTER — PATIENT OUTREACH (OUTPATIENT)
Dept: CARE COORDINATION | Facility: CLINIC | Age: 63
End: 2023-07-18
Payer: COMMERCIAL

## 2023-07-20 DIAGNOSIS — M81.0 AGE-RELATED OSTEOPOROSIS WITHOUT CURRENT PATHOLOGICAL FRACTURE: ICD-10-CM

## 2023-07-20 NOTE — TELEPHONE ENCOUNTER
Medication Question or Refill    Contacts       Type Contact Phone/Fax    07/20/2023 10:01 AM CDT Phone (Incoming) Aixa Hester (Self) [phone] (M)          What medication are you calling about (include dose and sig)?: alendronate (FOSAMAX) 70 MG tablet    Preferred Pharmacy:   [facility] [place], [place] - [address] [address]  [address] [address] [address]  Phone: [phone] Fax: [phone]      Controlled Substance Agreement on file:   CSA -- Patient Level:    CSA: None found at the patient level.       Who prescribed the medication?: Kecia Fischer    Do you need a refill? Yes.  Patient has 5 weeks of medication.  Is scheduled to see Kecia Fischer on Thursday, September 7th and will need a refill to get her thru to that time.    When did you use the medication last? ?    Patient offered an appointment? No    Do you have any questions or concerns?  No      Could we send this information to you in [facility] or would you prefer to receive a phone call?:   Patient would prefer a phone call   Okay to leave a detailed message?: Yes at Cell number on file:    Telephone Information:   Mobile [phone]

## 2023-07-25 RX ORDER — ALENDRONATE SODIUM 70 MG/1
70 TABLET ORAL
Qty: 8 TABLET | Refills: 0 | Status: SHIPPED | OUTPATIENT
Start: 2023-07-25 | End: 2023-09-07

## 2023-08-24 ENCOUNTER — ANCILLARY PROCEDURE (OUTPATIENT)
Dept: MAMMOGRAPHY | Facility: CLINIC | Age: 63
End: 2023-08-24
Attending: NURSE PRACTITIONER
Payer: COMMERCIAL

## 2023-08-24 DIAGNOSIS — Z12.31 VISIT FOR SCREENING MAMMOGRAM: ICD-10-CM

## 2023-08-24 PROCEDURE — 77067 SCR MAMMO BI INCL CAD: CPT | Mod: TC | Performed by: RADIOLOGY

## 2023-08-31 ASSESSMENT — ENCOUNTER SYMPTOMS
DIZZINESS: 0
EYE PAIN: 0
ABDOMINAL PAIN: 0
WEAKNESS: 0
HEARTBURN: 0
FEVER: 0
CONSTIPATION: 0
FREQUENCY: 0
HEMATOCHEZIA: 0
MYALGIAS: 0
DYSURIA: 0
HEMATURIA: 0
ARTHRALGIAS: 0
PARESTHESIAS: 0
SHORTNESS OF BREATH: 0
PALPITATIONS: 0
CHILLS: 0
NAUSEA: 0
BREAST MASS: 0
DIARRHEA: 0
NERVOUS/ANXIOUS: 1
SORE THROAT: 0
HEADACHES: 0
COUGH: 0
JOINT SWELLING: 0

## 2023-09-07 ENCOUNTER — OFFICE VISIT (OUTPATIENT)
Dept: FAMILY MEDICINE | Facility: CLINIC | Age: 63
End: 2023-09-07
Payer: COMMERCIAL

## 2023-09-07 VITALS
BODY MASS INDEX: 25.21 KG/M2 | SYSTOLIC BLOOD PRESSURE: 96 MMHG | HEART RATE: 67 BPM | WEIGHT: 137 LBS | DIASTOLIC BLOOD PRESSURE: 60 MMHG | HEIGHT: 62 IN | OXYGEN SATURATION: 98 % | TEMPERATURE: 96.5 F | RESPIRATION RATE: 14 BRPM

## 2023-09-07 DIAGNOSIS — Z00.00 ROUTINE GENERAL MEDICAL EXAMINATION AT A HEALTH CARE FACILITY: Primary | ICD-10-CM

## 2023-09-07 DIAGNOSIS — G47.9 SLEEP DISTURBANCE: ICD-10-CM

## 2023-09-07 DIAGNOSIS — M81.0 AGE-RELATED OSTEOPOROSIS WITHOUT CURRENT PATHOLOGICAL FRACTURE: ICD-10-CM

## 2023-09-07 LAB
ALBUMIN SERPL BCG-MCNC: 4.3 G/DL (ref 3.5–5.2)
ALP SERPL-CCNC: 53 U/L (ref 35–104)
ALT SERPL W P-5'-P-CCNC: 15 U/L (ref 0–50)
ANION GAP SERPL CALCULATED.3IONS-SCNC: 8 MMOL/L (ref 7–15)
AST SERPL W P-5'-P-CCNC: 25 U/L (ref 0–45)
BILIRUB SERPL-MCNC: 0.4 MG/DL
BUN SERPL-MCNC: 14.5 MG/DL (ref 8–23)
CALCIUM SERPL-MCNC: 9.7 MG/DL (ref 8.8–10.2)
CHLORIDE SERPL-SCNC: 104 MMOL/L (ref 98–107)
CHOLEST SERPL-MCNC: 227 MG/DL
CREAT SERPL-MCNC: 0.85 MG/DL (ref 0.51–0.95)
DEPRECATED CALCIDIOL+CALCIFEROL SERPL-MC: 54 UG/L (ref 20–75)
DEPRECATED HCO3 PLAS-SCNC: 26 MMOL/L (ref 22–29)
EGFRCR SERPLBLD CKD-EPI 2021: 77 ML/MIN/1.73M2
GLUCOSE SERPL-MCNC: 87 MG/DL (ref 70–99)
HDLC SERPL-MCNC: 80 MG/DL
LDLC SERPL CALC-MCNC: 138 MG/DL
MAGNESIUM SERPL-MCNC: 2.3 MG/DL (ref 1.7–2.3)
NONHDLC SERPL-MCNC: 147 MG/DL
PHOSPHATE SERPL-MCNC: 3.7 MG/DL (ref 2.5–4.5)
POTASSIUM SERPL-SCNC: 4.5 MMOL/L (ref 3.4–5.3)
PROT SERPL-MCNC: 7 G/DL (ref 6.4–8.3)
PTH-INTACT SERPL-MCNC: 36 PG/ML (ref 15–65)
SODIUM SERPL-SCNC: 138 MMOL/L (ref 136–145)
T4 FREE SERPL-MCNC: 1.14 NG/DL (ref 0.9–1.7)
TRIGL SERPL-MCNC: 47 MG/DL
TSH SERPL DL<=0.005 MIU/L-ACNC: 4.68 UIU/ML (ref 0.3–4.2)

## 2023-09-07 PROCEDURE — 80053 COMPREHEN METABOLIC PANEL: CPT | Performed by: NURSE PRACTITIONER

## 2023-09-07 PROCEDURE — 83970 ASSAY OF PARATHORMONE: CPT | Performed by: NURSE PRACTITIONER

## 2023-09-07 PROCEDURE — 80061 LIPID PANEL: CPT | Performed by: NURSE PRACTITIONER

## 2023-09-07 PROCEDURE — 99213 OFFICE O/P EST LOW 20 MIN: CPT | Mod: 25 | Performed by: NURSE PRACTITIONER

## 2023-09-07 PROCEDURE — 84439 ASSAY OF FREE THYROXINE: CPT | Performed by: NURSE PRACTITIONER

## 2023-09-07 PROCEDURE — 84100 ASSAY OF PHOSPHORUS: CPT | Performed by: NURSE PRACTITIONER

## 2023-09-07 PROCEDURE — 99396 PREV VISIT EST AGE 40-64: CPT | Performed by: NURSE PRACTITIONER

## 2023-09-07 PROCEDURE — 82306 VITAMIN D 25 HYDROXY: CPT | Performed by: NURSE PRACTITIONER

## 2023-09-07 PROCEDURE — 84443 ASSAY THYROID STIM HORMONE: CPT | Performed by: NURSE PRACTITIONER

## 2023-09-07 PROCEDURE — 83735 ASSAY OF MAGNESIUM: CPT | Performed by: NURSE PRACTITIONER

## 2023-09-07 PROCEDURE — 36415 COLL VENOUS BLD VENIPUNCTURE: CPT | Performed by: NURSE PRACTITIONER

## 2023-09-07 RX ORDER — ALENDRONATE SODIUM 70 MG/1
70 TABLET ORAL
Qty: 12 TABLET | Refills: 3 | Status: SHIPPED | OUTPATIENT
Start: 2023-09-07 | End: 2024-08-23

## 2023-09-07 ASSESSMENT — ENCOUNTER SYMPTOMS
BREAST MASS: 0
NERVOUS/ANXIOUS: 1
ABDOMINAL PAIN: 0
ARTHRALGIAS: 0
CHILLS: 0
FREQUENCY: 0
PALPITATIONS: 0
JOINT SWELLING: 0
EYE PAIN: 0
NAUSEA: 0
DIZZINESS: 0
DYSURIA: 0
WEAKNESS: 0
CONSTIPATION: 0
FEVER: 0
HEARTBURN: 0
SORE THROAT: 0
HEMATOCHEZIA: 0
HEADACHES: 0
MYALGIAS: 0
DIARRHEA: 0
PARESTHESIAS: 0
SHORTNESS OF BREATH: 0
COUGH: 0
HEMATURIA: 0

## 2023-09-07 ASSESSMENT — PAIN SCALES - GENERAL: PAINLEVEL: NO PAIN (0)

## 2023-09-07 NOTE — PATIENT INSTRUCTIONS
Please continue to take your Fosamax, calcium and vitamin D.  We will plan to recheck your bone density test in 2 years.    Please let me know if your sleepless nights become more frequent.    Preventive Health Recommendations  Female Ages 50 - 64    Yearly exam: See your health care provider every year in order to  Review health changes.   Discuss preventive care.    Review your medicines if your doctor has prescribed any.    Get a Pap test every three years (unless you have an abnormal result and your provider advises testing more often).  If you get Pap tests with HPV test, you only need to test every 5 years, unless you have an abnormal result.   You do not need a Pap test if your uterus was removed (hysterectomy) and you have not had cancer.  You should be tested each year for STDs (sexually transmitted diseases) if you're at risk.   Have a mammogram every 1 to 2 years.  Have a colonoscopy at age 50, or have a yearly FIT test (stool test). These exams screen for colon cancer.    Have a cholesterol test every 5 years, or more often if advised.  Have a diabetes test (fasting glucose) every three years. If you are at risk for diabetes, you should have this test more often.   If you are at risk for osteoporosis (brittle bone disease), think about having a bone density scan (DEXA).    Shots: Get a flu shot each year. Get a tetanus shot every 10 years.    Nutrition:   Eat at least 5 servings of fruits and vegetables each day.  Eat whole-grain bread, whole-wheat pasta and brown rice instead of white grains and rice.  Get adequate Calcium and Vitamin D.     Lifestyle  Exercise at least 150 minutes a week (30 minutes a day, 5 days a week). This will help you control your weight and prevent disease.  Limit alcohol to one drink per day.  No smoking.   Wear sunscreen to prevent skin cancer.   See your dentist every six months for an exam and cleaning.  See your eye doctor every 1 to 2 years.

## 2023-09-07 NOTE — PROGRESS NOTES
SUBJECTIVE:   CC: Aixa is an 63 year old who presents for preventive health visit.       9/7/2023     7:15 AM   Additional Questions   Roomed by Bora POTTER   Accompanied by JOSE D         9/7/2023     7:15 AM   Patient Reported Additional Medications   Patient reports taking the following new medications NA       Healthy Habits:     Getting at least 3 servings of Calcium per day:  Yes    Bi-annual eye exam:  Yes    Dental care twice a year:  Yes    Sleep apnea or symptoms of sleep apnea:  Daytime drowsiness    Diet:  Gluten-free/reduced    Frequency of exercise:  6-7 days/week    Duration of exercise:  30-45 minutes    Taking medications regularly:  Yes    Medication side effects:  None    Additional concerns today:  Yes      Today's PHQ-2 Score:       9/6/2023     5:35 PM   PHQ-2 ( 1999 [employer])   Q1: Little interest or pleasure in doing things 1   Q2: Feeling down, depressed or hopeless 1   PHQ-2 Score 2   Q1: Little interest or pleasure in doing things Several days   Q2: Feeling down, depressed or hopeless Several days   PHQ-2 Score 2       Pt complains that there's no change in insomnia. Would like to discuss options on how to get better sleep.    Discuss osteoporosis plan with provider      Social History     Tobacco Use    Smoking status: Never     Passive exposure: Never    Smokeless tobacco: Never   Substance Use Topics    Alcohol use: Yes     Comment: seldom             8/31/2023     7:52 PM   Alcohol Use   Prescreen: >3 drinks/day or >7 drinks/week? No     Reviewed orders with patient.  Reviewed health maintenance and updated orders accordingly -   Lab work is in process  Labs reviewed in [facility]  Current Outpatient Medications   Medication Sig Dispense Refill    alendronate (FOSAMAX) 70 MG tablet Take 1 tablet (70 mg) by mouth every 7 days 12 tablet 3    calcium-magnesium (CALMAG) 500-250 MG TABS per tablet Take 1 tablet by mouth 2 times daily      Fish Oil-Cholecalciferol (OMEGA-3 FISH OIL/VITAMIN D3 PO)        melatonin 5 MG tablet Take 5 mg by mouth nightly as needed for sleep       Allergies   Allergen Reactions    Nka [No Known Allergies]        Breast Cancer Screenin/20/2021     9:00 AM 2021     4:02 PM 2021     4:14 PM   Breast CA Risk Assessment (FHS-7)   Do you have a family history of breast, colon, or ovarian cancer? No / Unknown No / Unknown No / Unknown       Pertinent mammograms are reviewed under the imaging tab.    History of abnormal Pap smear:      Reviewed and updated as needed this visit by clinical staff   Tobacco  Allergies  Meds              Reviewed and updated as needed this visit by Provider                   Here today for physical.  Is fasting for labs.  Continues to take fosamax and that is going well.  No negative side effects.  Is taking calcium and vitamin D.  Does not smoke.  Is very active, teaches yoga, does Pilates, cleans houses.    Will sleep okay for several nights in a row and then will have nights that does not sleep well. Will drift in and out of sleep. Does have a family hsitory of alzhimers. Takes melatonin nightly. Has used trazadone in the past. Benadryl in the past and that does not work. Has not noticed any relation between activity and lack of sleep. Has not been able to track reason as to why wakes up. Does not really feel more tired when wakes.     Last Pap: 2019 normal. Abnormal years ago that was repeated and was normal.   Last mammogram: -normal. No family history  Last BMD: -osteoporosis.   Last Colonoscopy: -normal.  cologuard neg. No family history  Last eye exam: yearly  Last dental exam: every 6 mo  Last tetanus vaccine:   Last influenza vaccine: Plans to get at the pharmacy  Last shingles vaccine: Has had both doses  Last pneumonia vaccine: N/A  Last COVID vaccine: has had boht doses  Last COVID booster:   Hep C screen:   HIV screen:   AAA screen (age 65-78 with smoking hx): N/A  IVD (HTN, Hyperlipid, Smoking):  "N/A  Lung CA screening (50-77, 20 pk smoking hx) Quit within 15 years: N/A      Review of Systems   Constitutional:  Negative for chills and fever.   HENT:  Negative for congestion, ear pain, hearing loss and sore throat.    Eyes:  Negative for pain and visual disturbance.   Respiratory:  Negative for cough and shortness of breath.    Cardiovascular:  Negative for chest pain, palpitations and peripheral edema.   Gastrointestinal:  Negative for abdominal pain, constipation, diarrhea, heartburn, hematochezia and nausea.   Breasts:  Negative for tenderness, breast mass and discharge.   Genitourinary:  Negative for dysuria, frequency, genital sores, hematuria, pelvic pain, urgency, vaginal bleeding and vaginal discharge.   Musculoskeletal:  Positive for arthritis. Negative for arthralgias, joint swelling and myalgias.   Skin:  Negative for rash.   Neurological:  Negative for dizziness, weakness, headaches and paresthesias.   Psychiatric/Behavioral:  Negative for mood changes. The patient is nervous/anxious.         OBJECTIVE:   BP 96/60   Pulse 67   Temp (!) 96.5  F (35.8  C) (Temporal)   Resp 14   Ht 1.57 m (5' 1.81\")   Wt 62.1 kg (137 lb)   LMP  (LMP Unknown)   SpO2 98%   BMI 25.21 kg/m    Physical Exam  Constitutional:       Appearance: Normal appearance. She is well-developed.   HENT:      Head: Normocephalic and atraumatic.      Right Ear: Tympanic membrane and external ear normal. No middle ear effusion.      Left Ear: Tympanic membrane and external ear normal.  No middle ear effusion.      Nose: No mucosal edema.   Neck:      Thyroid: No thyromegaly.      Vascular: No carotid bruit.   Cardiovascular:      Rate and Rhythm: Normal rate and regular rhythm.      Heart sounds: Normal heart sounds.   Pulmonary:      Effort: Pulmonary effort is normal.      Breath sounds: Normal breath sounds.   Abdominal:      General: Bowel sounds are normal.      Palpations: Abdomen is soft.   Skin:     General: Skin is warm " and dry.   Neurological:      Mental Status: She is alert.   Psychiatric:         Behavior: Behavior normal.         ASSESSMENT/PLAN:   Age-related osteoporosis without current pathological fracture  Reviewed bone density test reviewed.  Continue Fosamax.  Continue calcium and vitamin D.  We will recheck labs here today.  Plan to repeat bone density test in 2025  - Vitamin D Deficiency; Future  - Phosphorus; Future  - Parathyroid Hormone Intact; Future  - Magnesium; Future  - alendronate (FOSAMAX) 70 MG tablet; Take 1 tablet (70 mg) by mouth every 7 days    Routine general medical examination at a health care facility  Screening guidelines reviewed.   - REVIEW OF HEALTH MAINTENANCE PROTOCOL ORDERS  - Comprehensive metabolic panel; Future  - TSH with free T4 reflex; Future  - Lipid panel reflex to direct LDL Fasting; Future     Sleep disturbance  Experiencing sleep disturbance 10 nights per month.  Discussed good sleep hygiene.  We will plan to continue to monitor.  Consider adding nightly medication in the future if becomes more frequent.      COUNSELING:  Reviewed preventive health counseling, as reflected in patient instructions        She reports that she has never smoked. She has never been exposed to tobacco smoke. She has never used smokeless tobacco.          ОЛЕГ [facility]

## 2023-09-28 ENCOUNTER — OFFICE VISIT (OUTPATIENT)
Dept: DERMATOLOGY | Facility: CLINIC | Age: 63
End: 2023-09-28
Payer: COMMERCIAL

## 2023-09-28 DIAGNOSIS — L81.4 LENTIGINES: ICD-10-CM

## 2023-09-28 DIAGNOSIS — D22.9 MULTIPLE BENIGN NEVI: Primary | ICD-10-CM

## 2023-09-28 DIAGNOSIS — Z12.83 ENCOUNTER FOR SCREENING FOR MALIGNANT NEOPLASM OF SKIN: ICD-10-CM

## 2023-09-28 DIAGNOSIS — D18.01 CHERRY ANGIOMA: ICD-10-CM

## 2023-09-28 DIAGNOSIS — L82.1 SEBORRHEIC KERATOSES: ICD-10-CM

## 2023-09-28 DIAGNOSIS — D48.9 NEOPLASM OF UNCERTAIN BEHAVIOR: ICD-10-CM

## 2023-09-28 DIAGNOSIS — L82.0 INFLAMED SEBORRHEIC KERATOSIS: ICD-10-CM

## 2023-09-28 PROCEDURE — 11102 TANGNTL BX SKIN SINGLE LES: CPT | Mod: XS | Performed by: NURSE PRACTITIONER

## 2023-09-28 PROCEDURE — 17110 DESTRUCTION B9 LES UP TO 14: CPT | Performed by: NURSE PRACTITIONER

## 2023-09-28 PROCEDURE — 99203 OFFICE O/P NEW LOW 30 MIN: CPT | Mod: 25 | Performed by: NURSE PRACTITIONER

## 2023-09-28 PROCEDURE — 88305 TISSUE EXAM BY PATHOLOGIST: CPT | Performed by: DERMATOLOGY

## 2023-09-28 NOTE — LETTER
9/28/2023         RE: Aixa Hester  [address] [address]        Dear Colleague,    Thank you for referring your patient, Aixa Hester, to the [facility]. Please see a copy of my visit note below.    [facility] Dermatology Note  Encounter Date: Sep 28, 2023  Office Visit     Reviewed patients past medical history and pertinent chart review prior to patients visit today.     Dermatology Problem List:  NUB right superior cheek, shave biopsy 09/28/23 .   ISK, cryo 9/28/2023     Patient denies personal history of skin cancer or dysplastic nevi.    She thinks her mother had BCC, no family history of melanoma  ____________________________________________    Assessment & Plan:     # Neoplasm of uncertain behavior:  right superior cheek  DDx includes nevus versus BCC Shave biopsy today.    Procedure Note: Biopsy by shave technique  The risks and benefits of the procedure were described to the patient. These include but are not limited to bleeding, infection, scar, incomplete removal, and non-diagnostic biopsy. Verbal informed consent was obtained. The above site(s) was cleansed with an alcohol pad and injected with 1% lidocaine with epinephrine. Once anesthesia was obtained, a biopsy(ies) was performed with Gilette blade. The tissue(s) was placed in a labeled container(s) with formalin and sent to pathology. Hemostasis was achieved with aluminum chloride. Vaseline and a bandage were applied to the wound(s). The patient tolerated the procedure well and was given post biopsy care instructions.    # Irritated and inflamed Seborrheic Keratosis. Discussed treatment options with patient including no treatment, cryotherapy, and shave removal. Patient prefers cryotherapy today due to irritation and itching. After verbal consent and discussion of risks and benefits including but no limited to dyspigmentation/scar, blister, and pain, 4 was(were) treated with 1-2mm  freeze border for 2 cycles with liquid nitrogen. Post cryotherapy instructions were provided.       # Benign skin findings including: seborrheic keratoses, cherry angioma, lentigines and benign nevi.   - No further intervention required. Patient to report changes.   - Patient reassured of the benign nature of these lesions.    #Signs and Symptoms of non-melanoma skin cancer and ABCDEs of melanoma reviewed with patient. Patient encouraged to perform monthly self skin exams and educated on how to perform them. UV precautions reviewed with patient. Patient was asked about new or changing moles/lesions on body.     #Reviewed Sunscreen: Apply 20 minutes prior to going outdoors and reapply every two hours, when wet or sweating. We recommend using an SPF 30 or higher, and to use one that is water resistant.       Follow-up:  1-2 years for follow up full body skin exam, prn for new or changing lesions or new concerns    Shruthi Aranda CNP  Dermatology     ____________________________________________    CC: Skin Check and Skin Tags (Skin tag near bra strap that would like removed)    HPI:  Ms. Aixa Hester is a(n) 63 year old female who presents today as a return patient for a full body skin cancer screening. Patient has concerns today about some irritated spots on her bra line, right neck, and behind her right knee.  They sometimes get irritated or itchy.  She would like them removed if possible.  I also asked her about a bump on her right cheek.  She thinks it may have been there for few years but is newer and may be growing.  It is asymptomatic..     Patient is otherwise feeling well, without additional skin concerns.     Physical Exam:  Vitals: LMP  (LMP Unknown)   SKIN: Total skin excluding the genitalia areas was performed. The exam included the head/face, neck, both arms, chest, back, abdomen, both legs, digits, mons pubis, buttock and nails.   -Right superior cheek, 4 to 5 mm pink shiny dome-shaped papule with some  superficial vessels on dermoscopy within  -several 1-2mm red dome shaped symmetric papules scattered on the trunk  -multiple tan/brown flat round macules and raised papules scattered throughout trunk, extremities and head. No worrisome features for malignancy noted on examination.  -scattered tan, homogenous macules scattered on sun exposed areas of trunk, extremities and face.   -scattered waxy, stuck on tan/brown papules and patches on the trunk neck and extremities    - No other lesions of concern on areas examined.   ri  Medications:  Current Outpatient Medications   Medication     alendronate (FOSAMAX) 70 MG tablet     calcium-magnesium (CALMAG) 500-250 MG TABS per tablet     Fish Oil-Cholecalciferol (OMEGA-3 FISH OIL/VITAMIN D3 PO)     melatonin 5 MG tablet     No current facility-administered medications for this visit.      Past Medical History:   Patient Active Problem List   Diagnosis     Shoulder pain, left     Age-related osteoporosis without current pathological fracture     Past Medical History:   Diagnosis Date     Chronic right shoulder pain 1998    from traumatic injury     Depressive disorder 1979     Eczema        CC Referred Self, MD  No address on file on close of this encounter.      Again, thank you for allowing me to participate in the care of your patient.        Sincerely,        Carolyn Aranda, ОЛЕГ CNP

## 2023-09-28 NOTE — PROGRESS NOTES
[facility] Dermatology Note  Encounter Date: Sep 28, 2023  Office Visit     Reviewed patients past medical history and pertinent chart review prior to patients visit today.     Dermatology Problem List:  NUB right superior cheek, shave biopsy 09/28/23 .   ISK, cryo 9/28/2023     Patient denies personal history of skin cancer or dysplastic nevi.    She thinks her mother had BCC, no family history of melanoma  ____________________________________________    Assessment & Plan:     # Neoplasm of uncertain behavior:  right superior cheek  DDx includes nevus versus BCC Shave biopsy today.    Procedure Note: Biopsy by shave technique  The risks and benefits of the procedure were described to the patient. These include but are not limited to bleeding, infection, scar, incomplete removal, and non-diagnostic biopsy. Verbal informed consent was obtained. The above site(s) was cleansed with an alcohol pad and injected with 1% lidocaine with epinephrine. Once anesthesia was obtained, a biopsy(ies) was performed with Gilette blade. The tissue(s) was placed in a labeled container(s) with formalin and sent to pathology. Hemostasis was achieved with aluminum chloride. Vaseline and a bandage were applied to the wound(s). The patient tolerated the procedure well and was given post biopsy care instructions.    # Irritated and inflamed Seborrheic Keratosis. Discussed treatment options with patient including no treatment, cryotherapy, and shave removal. Patient prefers cryotherapy today due to irritation and itching. After verbal consent and discussion of risks and benefits including but no limited to dyspigmentation/scar, blister, and pain, 4 was(were) treated with 1-2mm freeze border for 2 cycles with liquid nitrogen. Post cryotherapy instructions were provided.       # Benign skin findings including: seborrheic keratoses, cherry angioma, lentigines and benign nevi.   - No further intervention required. Patient to  report changes.   - Patient reassured of the benign nature of these lesions.    #Signs and Symptoms of non-melanoma skin cancer and ABCDEs of melanoma reviewed with patient. Patient encouraged to perform monthly self skin exams and educated on how to perform them. UV precautions reviewed with patient. Patient was asked about new or changing moles/lesions on body.     #Reviewed Sunscreen: Apply 20 minutes prior to going outdoors and reapply every two hours, when wet or sweating. We recommend using an SPF 30 or higher, and to use one that is water resistant.       Follow-up:  1-2 years for follow up full body skin exam, prn for new or changing lesions or new concerns    Shruthi Aranda, KELLY  Dermatology     ____________________________________________    CC: Skin Check and Skin Tags (Skin tag near bra strap that would like removed)    HPI:  Ms. Aixa Hester is a(n) 63 year old female who presents today as a return patient for a full body skin cancer screening. Patient has concerns today about some irritated spots on her bra line, right neck, and behind her right knee.  They sometimes get irritated or itchy.  She would like them removed if possible.  I also asked her about a bump on her right cheek.  She thinks it may have been there for few years but is newer and may be growing.  It is asymptomatic..     Patient is otherwise feeling well, without additional skin concerns.     Physical Exam:  Vitals: LMP  (LMP Unknown)   SKIN: Total skin excluding the genitalia areas was performed. The exam included the head/face, neck, both arms, chest, back, abdomen, both legs, digits, mons pubis, buttock and nails.   -Right superior cheek, 4 to 5 mm pink shiny dome-shaped papule with some superficial vessels on dermoscopy within  -several 1-2mm red dome shaped symmetric papules scattered on the trunk  -multiple tan/brown flat round macules and raised papules scattered throughout trunk, extremities and head. No worrisome features for  malignancy noted on examination.  -scattered tan, homogenous macules scattered on sun exposed areas of trunk, extremities and face.   -scattered waxy, stuck on tan/brown papules and patches on the trunk neck and extremities    - No other lesions of concern on areas examined.   ri  Medications:  Current Outpatient Medications   Medication    alendronate (FOSAMAX) 70 MG tablet    calcium-magnesium (CALMAG) 500-250 MG TABS per tablet    Fish Oil-Cholecalciferol (OMEGA-3 FISH OIL/VITAMIN D3 PO)    melatonin 5 MG tablet     No current facility-administered medications for this visit.      Past Medical History:   Patient Active Problem List   Diagnosis    Shoulder pain, left    Age-related osteoporosis without current pathological fracture     Past Medical History:   Diagnosis Date    Chronic right shoulder pain 1998    from traumatic injury    Depressive disorder 1979    Eczema        CC Referred Self, MD  No address on file on close of this encounter.

## 2023-09-28 NOTE — PATIENT INSTRUCTIONS
Wound Care After a Biopsy    What is a skin biopsy?  A skin biopsy allows the doctor to examine a very small piece of tissue under the microscope to determine the diagnosis and the best treatment for the skin condition. A local anesthetic (numbing medicine)  is injected with a very small needle into the skin area to be tested. A small piece of skin is taken from the area. Sometimes a suture (stitch) is used.     What are the risks of a skin biopsy?  I will experience scar, bleeding, swelling, pain, crusting and redness. I may experience incomplete removal or recurrence. Risks of this procedure are excessive bleeding, bruising, infection, nerve damage, numbness, thick (hypertrophic or keloidal) scar and non-diagnostic biopsy.    How should I care for my wound for the first 24 hours?  Keep the wound dry and covered for 24 hours  If it bleeds, hold direct pressure on the area for 15 minutes. If bleeding does not stop then go to the emergency room  Avoid strenuous exercise the first 1-2 days or as your doctor instructs you    How should I care for the wound after 24 hours?  After 24 hours, remove the bandage  You may bathe or shower as normal  If you had a scalp biopsy, you can shampoo as usual and can use shower water to clean the biopsy site daily  Clean the wound twice a day with gentle soap and water  Do not scrub, be gentle  Apply white petroleum/Vaseline after cleaning the wound with a cotton swab or a clean finger, and keep the site covered with a Bandaid /bandage. Bandages are not necessary with a scalp biopsy  If you are unable to cover the site with a Bandaid /bandage, re-apply ointment 2-3 times a day to keep the site moist. Moisture will help with healing  Avoid strenuous activity for first 1-2 days  Avoid lakes, rivers, pools, and oceans until the stitches are removed or the site is healed    How do I clean my wound?  Wash hands thoroughly with soap or use hand  before all wound care  Clean the  wound with gentle soap and water  Apply white petroleum/Vaseline  to wound after it is clean  Replace the Bandaid /bandage to keep the wound covered for the first few days or as instructed by your doctor  If you had a scalp biopsy, warm shower water to the area on a daily basis should suffice    What should I use to clean my wound?   Cotton-tipped applicators (Qtips )  White petroleum jelly (Vaseline ). Use a clean new container and use Q-tips to apply.  Bandaids   as needed  Gentle soap     How should I care for my wound long term?  Do not get your wound dirty  Keep up with wound care for one week or until the area is healed.  A small scab will form and fall off by itself when the area is completely healed. The area will be red and will become pink in color as it heals. Sun protection is very important for how your scar will turn out. Sunscreen with an SPF 30 or greater is recommended once the area is healed.  You should have some soreness but it should be mild and slowly go away over several days. Talk to your doctor about using tylenol for pain,    When should I call my doctor?  If you have increased:   Pain or swelling  Pus or drainage (clear or slightly yellow drainage is ok)  Temperature over 100F  Spreading redness or warmth around wound    When will I hear about my results?  The biopsy results can take 2 weeks to come back.  Your results will automatically release to [facility] before your provider has even reviewed them.  The clinic will call you with the results, send you a [employer] message, or have you schedule a follow-up clinic or phone time to discuss the results.  Contact our clinics if you do not hear from us in 2 weeks.    If you have any questions please send us a [facility] message or call us at [phone]    Thank you,  [facility]

## 2023-10-03 LAB
PATH REPORT.COMMENTS IMP SPEC: NORMAL
PATH REPORT.COMMENTS IMP SPEC: NORMAL
PATH REPORT.FINAL DX SPEC: NORMAL
PATH REPORT.GROSS SPEC: NORMAL
PATH REPORT.MICROSCOPIC SPEC OTHER STN: NORMAL
PATH REPORT.RELEVANT HX SPEC: NORMAL

## 2024-06-06 ENCOUNTER — MYC MEDICAL ADVICE (OUTPATIENT)
Dept: FAMILY MEDICINE | Facility: CLINIC | Age: 64
End: 2024-06-06
Payer: COMMERCIAL

## 2024-06-07 ENCOUNTER — TELEPHONE (OUTPATIENT)
Dept: FAMILY MEDICINE | Facility: CLINIC | Age: 64
End: 2024-06-07
Payer: COMMERCIAL

## 2024-06-07 DIAGNOSIS — G47.9 SLEEP DISTURBANCE: Primary | ICD-10-CM

## 2024-06-07 NOTE — TELEPHONE ENCOUNTER
Requesting to speak with Kecia Fischer's care team.  Has appointment on 7-11-24 to check on a medication.  Will run out before the appointment date on 7-8-24.  Would like to see if she can be seen sooner.  Please call.  OK to LM on VM

## 2024-06-07 NOTE — TELEPHONE ENCOUNTER
Pt stated that requesting medication was prescribed by a different provider. Pt was given mirtazapine 30mg tablets but splits pills in half for mirtazapine 15mg dose.     Pt is OK with virtual appointment if needed. Pt can wait for pcp response as she will be out of medication 7/8/24.    Do you want virtual appointment prior to 7/11/24 in person visit?    Naya Odell RN on 6/7/2024 at 11:18 AM

## 2024-06-09 NOTE — TELEPHONE ENCOUNTER
Keep already scheduled appointment.    Confirm med dose and pharmacy that is needed. Will send short refill until can be seen.    Kecia STRATTON

## 2024-06-10 RX ORDER — MIRTAZAPINE 15 MG/1
15 TABLET, FILM COATED ORAL AT BEDTIME
Qty: 7 TABLET | Refills: 0 | Status: SHIPPED | OUTPATIENT
Start: 2024-06-10 | End: 2024-06-17

## 2024-06-10 NOTE — TELEPHONE ENCOUNTER
Patient returned call. She is taking mirtazapine 15 mg 1 tablet daily. She feels comfortable at this dose.     ramona'kameron Díaz RN on 6/10/2024 at 2:26 PM

## 2024-06-10 NOTE — TELEPHONE ENCOUNTER
Called patient and left a voicemail to return our call to the clinic.       Blanca Lagos RN on 6/10/2024 at 10:43 AM

## 2024-06-10 NOTE — TELEPHONE ENCOUNTER
I see PCP will be on vacation for patient's 7/11/24 visit, needs to be rescheduled.   PCP only sent in 7 tab refill (7 days).   No openings during that time.    Routed to PCP Kecia Fischer, can we offer a same day or approval req hold for this patient to get her seen in 7 days?    Carey ARMAS RN  [facility] Triage

## 2024-06-11 NOTE — TELEPHONE ENCOUNTER
Attempted to call patient with number on file to relay provider's message below with no answer, left voicemail to call clinic back at [phone].    Pcp has multiple virtual visits prior to 7/11/24 if pt prefers to be seen virtually for medication discussion and would not need another refill of this medication to get her to appointment.     Naya Odell, RN on 6/11/2024 at 9:20 AM

## 2024-06-13 NOTE — TELEPHONE ENCOUNTER
Called patient and she was in her car and could not hear caller, she said to call back and leave a message for her.     Called patient back and left a message to return our call to the clinic.     Please schedule as advised below if patient agreeable.     Thank you,  Beatriz Yung RN

## 2024-06-14 NOTE — TELEPHONE ENCOUNTER
Pt returned call. Pt would like another weeks worth of medication and would like to keep in person appt scheduled for 7/18.    Beatrice Jeong RN  [facility]

## 2024-06-17 RX ORDER — MIRTAZAPINE 15 MG/1
15 TABLET, FILM COATED ORAL AT BEDTIME
Qty: 30 TABLET | Refills: 0 | Status: SHIPPED | OUTPATIENT
Start: 2024-06-17 | End: 2024-07-18

## 2024-06-23 DIAGNOSIS — M81.0 AGE-RELATED OSTEOPOROSIS WITHOUT CURRENT PATHOLOGICAL FRACTURE: ICD-10-CM

## 2024-06-24 RX ORDER — ALENDRONATE SODIUM 70 MG/1
TABLET ORAL
Qty: 12 TABLET | Refills: 3 | OUTPATIENT
Start: 2024-06-24

## 2024-07-18 ENCOUNTER — OFFICE VISIT (OUTPATIENT)
Dept: FAMILY MEDICINE | Facility: CLINIC | Age: 64
End: 2024-07-18
Payer: COMMERCIAL

## 2024-07-18 VITALS
BODY MASS INDEX: 24.33 KG/M2 | HEIGHT: 62 IN | WEIGHT: 132.2 LBS | RESPIRATION RATE: 16 BRPM | TEMPERATURE: 97.4 F | DIASTOLIC BLOOD PRESSURE: 62 MMHG | OXYGEN SATURATION: 98 % | HEART RATE: 71 BPM | SYSTOLIC BLOOD PRESSURE: 118 MMHG

## 2024-07-18 DIAGNOSIS — G47.9 SLEEP DISTURBANCE: Primary | ICD-10-CM

## 2024-07-18 DIAGNOSIS — F32.A ANXIETY AND DEPRESSION: ICD-10-CM

## 2024-07-18 DIAGNOSIS — F41.9 ANXIETY AND DEPRESSION: ICD-10-CM

## 2024-07-18 PROCEDURE — 99213 OFFICE O/P EST LOW 20 MIN: CPT | Performed by: NURSE PRACTITIONER

## 2024-07-18 RX ORDER — MIRTAZAPINE 15 MG/1
15 TABLET, FILM COATED ORAL AT BEDTIME
Qty: 90 TABLET | Refills: 0 | Status: SHIPPED | OUTPATIENT
Start: 2024-07-18 | End: 2024-09-26

## 2024-07-18 NOTE — PROGRESS NOTES
Assessment & Plan     Sleep disturbance  Plan to continue Remeron at 15 mg.  Refill sent.  If would like to taper off in the future recommended decreasing dose to 7.5 mg for at least 2 to 3 months prior to discontinuing.  Plan to follow-up in September for annual exam.  - mirtazapine (REMERON) 15 MG tablet; Take 1 tablet (15 mg) by mouth at bedtime    Anxiety and depression  Plan to continue Remeron at 15 mg.  Refill sent.  If would like to taper off in the future recommended decreasing dose to 7.5 mg for at least 2 to 3 months prior to discontinuing.  Plan to follow-up in September for annual exam.  - mirtazapine (REMERON) 15 MG tablet; Take 1 tablet (15 mg) by mouth at bedtime      Jatin Kruse is a 63 year old, presenting for the following health issues:  RECHECK      7/18/2024     7:54 AM   Additional Questions   Roomed by MP         7/18/2024     7:54 AM   Patient Reported Additional Medications   Patient reports taking the following new medications tumeric cucumin, multi with iron, vitamin d, areds 2     History of Present Illness       Reason for visit:  Renew mirtazapine    She eats 4 or more servings of fruits and vegetables daily.She consumes 0 sweetened beverage(s) daily.She exercises with enough effort to increase her heart rate 30 to 60 minutes per day.  She exercises with enough effort to increase her heart rate 7 days per week.   She is taking medications regularly.         Medication Followup of mirtazapine   Taking Medication as prescribed: yes  Side Effects:  None  Medication Helping Symptoms:  yes    In March was spiraling with depression, anxiety and inability to sleep.  Saw mental health provider through telehealth.  Was started on 30 mg of Remeron.  Took 1 dose of 30 mg and had a very hard time waking up the next AM. Started cutting in 1/2. Does feel like it was helping with sleep and depression.  No negative side effects that recognizes.  After nearly 37 years of marriage is getting  " from .  Knows this will be a big life change.  He is going to be moving out of their home within the next week or 2.  Would like to remain on Remeron through this time but would like to gradually decrease or taper off in the future.        Objective    /62   Pulse 71   Temp 97.4  F (36.3  C) (Temporal)   Resp 16   Ht 1.564 m (5' 1.58\")   Wt 60 kg (132 lb 3.2 oz)   LMP  (LMP Unknown)   SpO2 98%   BMI 24.51 kg/m    Body mass index is 24.51 kg/m .  Physical Exam  Constitutional:       Appearance: She is well-developed.   Cardiovascular:      Rate and Rhythm: Normal rate and regular rhythm.   Pulmonary:      Effort: Pulmonary effort is normal.      Breath sounds: Normal breath sounds.   Skin:     General: Skin is warm.   Neurological:      Mental Status: She is alert.   Psychiatric:         Mood and Affect: Mood normal.                Signed Electronically by: ОЛЕГ Owens CNP    "

## 2024-08-08 ENCOUNTER — PATIENT OUTREACH (OUTPATIENT)
Dept: CARE COORDINATION | Facility: CLINIC | Age: 64
End: 2024-08-08
Payer: COMMERCIAL

## 2024-08-20 ENCOUNTER — PATIENT OUTREACH (OUTPATIENT)
Dept: CARE COORDINATION | Facility: CLINIC | Age: 64
End: 2024-08-20
Payer: COMMERCIAL

## 2024-08-23 ENCOUNTER — MYC REFILL (OUTPATIENT)
Dept: FAMILY MEDICINE | Facility: CLINIC | Age: 64
End: 2024-08-23
Payer: COMMERCIAL

## 2024-08-23 DIAGNOSIS — M81.0 AGE-RELATED OSTEOPOROSIS WITHOUT CURRENT PATHOLOGICAL FRACTURE: ICD-10-CM

## 2024-08-23 RX ORDER — ALENDRONATE SODIUM 70 MG/1
70 TABLET ORAL
Qty: 4 TABLET | Refills: 0 | Status: SHIPPED | OUTPATIENT
Start: 2024-08-23 | End: 2024-09-26

## 2024-08-26 ENCOUNTER — ANCILLARY PROCEDURE (OUTPATIENT)
Dept: MAMMOGRAPHY | Facility: CLINIC | Age: 64
End: 2024-08-26
Attending: NURSE PRACTITIONER
Payer: COMMERCIAL

## 2024-08-26 DIAGNOSIS — Z12.31 VISIT FOR SCREENING MAMMOGRAM: ICD-10-CM

## 2024-08-26 PROCEDURE — 77067 SCR MAMMO BI INCL CAD: CPT | Mod: TC | Performed by: RADIOLOGY

## 2024-08-26 PROCEDURE — 77063 BREAST TOMOSYNTHESIS BI: CPT | Mod: TC | Performed by: RADIOLOGY

## 2024-09-06 ENCOUNTER — DOCUMENTATION ONLY (OUTPATIENT)
Dept: FAMILY MEDICINE | Facility: CLINIC | Age: 64
End: 2024-09-06
Payer: COMMERCIAL

## 2024-09-06 DIAGNOSIS — E78.5 HYPERLIPIDEMIA LDL GOAL <100: ICD-10-CM

## 2024-09-06 DIAGNOSIS — M81.0 AGE-RELATED OSTEOPOROSIS WITHOUT CURRENT PATHOLOGICAL FRACTURE: Primary | ICD-10-CM

## 2024-09-06 NOTE — PROGRESS NOTES
Patient has upcoming lab only appointment for PVL, please review and place future orders that may needed. If the lab is not needed, please let your care team to follow up with patient    Thank you    Bryan urisa.

## 2024-09-17 ENCOUNTER — LAB (OUTPATIENT)
Dept: LAB | Facility: CLINIC | Age: 64
End: 2024-09-17
Payer: COMMERCIAL

## 2024-09-17 DIAGNOSIS — M81.0 AGE-RELATED OSTEOPOROSIS WITHOUT CURRENT PATHOLOGICAL FRACTURE: ICD-10-CM

## 2024-09-17 DIAGNOSIS — E78.5 HYPERLIPIDEMIA LDL GOAL <100: ICD-10-CM

## 2024-09-17 LAB
ALBUMIN SERPL BCG-MCNC: 4.2 G/DL (ref 3.5–5.2)
ALP SERPL-CCNC: 55 U/L (ref 40–150)
ALT SERPL W P-5'-P-CCNC: 18 U/L (ref 0–50)
ANION GAP SERPL CALCULATED.3IONS-SCNC: 9 MMOL/L (ref 7–15)
AST SERPL W P-5'-P-CCNC: 22 U/L (ref 0–45)
BASOPHILS # BLD AUTO: 0 10E3/UL (ref 0–0.2)
BASOPHILS NFR BLD AUTO: 1 %
BILIRUB SERPL-MCNC: 0.3 MG/DL
BUN SERPL-MCNC: 11.1 MG/DL (ref 8–23)
CALCIUM SERPL-MCNC: 9.5 MG/DL (ref 8.8–10.4)
CHLORIDE SERPL-SCNC: 102 MMOL/L (ref 98–107)
CHOLEST SERPL-MCNC: 206 MG/DL
CREAT SERPL-MCNC: 0.84 MG/DL (ref 0.51–0.95)
EGFRCR SERPLBLD CKD-EPI 2021: 77 ML/MIN/1.73M2
EOSINOPHIL # BLD AUTO: 0.2 10E3/UL (ref 0–0.7)
EOSINOPHIL NFR BLD AUTO: 4 %
ERYTHROCYTE [DISTWIDTH] IN BLOOD BY AUTOMATED COUNT: 13.5 % (ref 10–15)
FASTING STATUS PATIENT QL REPORTED: YES
FASTING STATUS PATIENT QL REPORTED: YES
GLUCOSE SERPL-MCNC: 89 MG/DL (ref 70–99)
HCO3 SERPL-SCNC: 26 MMOL/L (ref 22–29)
HCT VFR BLD AUTO: 36.3 % (ref 35–47)
HDLC SERPL-MCNC: 69 MG/DL
HGB BLD-MCNC: 12.1 G/DL (ref 11.7–15.7)
IMM GRANULOCYTES # BLD: 0 10E3/UL
IMM GRANULOCYTES NFR BLD: 0 %
LDLC SERPL CALC-MCNC: 125 MG/DL
LYMPHOCYTES # BLD AUTO: 1.4 10E3/UL (ref 0.8–5.3)
LYMPHOCYTES NFR BLD AUTO: 35 %
MAGNESIUM SERPL-MCNC: 2.2 MG/DL (ref 1.7–2.3)
MCH RBC QN AUTO: 30.4 PG (ref 26.5–33)
MCHC RBC AUTO-ENTMCNC: 33.3 G/DL (ref 31.5–36.5)
MCV RBC AUTO: 91 FL (ref 78–100)
MONOCYTES # BLD AUTO: 0.4 10E3/UL (ref 0–1.3)
MONOCYTES NFR BLD AUTO: 11 %
NEUTROPHILS # BLD AUTO: 2 10E3/UL (ref 1.6–8.3)
NEUTROPHILS NFR BLD AUTO: 49 %
NONHDLC SERPL-MCNC: 137 MG/DL
PHOSPHATE SERPL-MCNC: 4.2 MG/DL (ref 2.5–4.5)
PLATELET # BLD AUTO: 269 10E3/UL (ref 150–450)
POTASSIUM SERPL-SCNC: 4.4 MMOL/L (ref 3.4–5.3)
PROT SERPL-MCNC: 6.9 G/DL (ref 6.4–8.3)
RBC # BLD AUTO: 3.98 10E6/UL (ref 3.8–5.2)
SODIUM SERPL-SCNC: 137 MMOL/L (ref 135–145)
TRIGL SERPL-MCNC: 61 MG/DL
VIT D+METAB SERPL-MCNC: 54 NG/ML (ref 20–50)
WBC # BLD AUTO: 4 10E3/UL (ref 4–11)

## 2024-09-17 PROCEDURE — 80061 LIPID PANEL: CPT

## 2024-09-17 PROCEDURE — 83735 ASSAY OF MAGNESIUM: CPT

## 2024-09-17 PROCEDURE — 80053 COMPREHEN METABOLIC PANEL: CPT

## 2024-09-17 PROCEDURE — 84100 ASSAY OF PHOSPHORUS: CPT

## 2024-09-17 PROCEDURE — 82306 VITAMIN D 25 HYDROXY: CPT

## 2024-09-17 PROCEDURE — 85025 COMPLETE CBC W/AUTO DIFF WBC: CPT

## 2024-09-17 PROCEDURE — 36415 COLL VENOUS BLD VENIPUNCTURE: CPT

## 2024-09-26 ENCOUNTER — OFFICE VISIT (OUTPATIENT)
Dept: FAMILY MEDICINE | Facility: CLINIC | Age: 64
End: 2024-09-26
Payer: COMMERCIAL

## 2024-09-26 VITALS
DIASTOLIC BLOOD PRESSURE: 60 MMHG | BODY MASS INDEX: 24.11 KG/M2 | RESPIRATION RATE: 18 BRPM | HEIGHT: 62 IN | WEIGHT: 131 LBS | HEART RATE: 63 BPM | TEMPERATURE: 97.8 F | OXYGEN SATURATION: 99 % | SYSTOLIC BLOOD PRESSURE: 106 MMHG

## 2024-09-26 DIAGNOSIS — F41.9 ANXIETY AND DEPRESSION: ICD-10-CM

## 2024-09-26 DIAGNOSIS — Z00.00 ROUTINE GENERAL MEDICAL EXAMINATION AT A HEALTH CARE FACILITY: Primary | ICD-10-CM

## 2024-09-26 DIAGNOSIS — Z12.4 CERVICAL CANCER SCREENING: ICD-10-CM

## 2024-09-26 DIAGNOSIS — M81.0 AGE-RELATED OSTEOPOROSIS WITHOUT CURRENT PATHOLOGICAL FRACTURE: ICD-10-CM

## 2024-09-26 DIAGNOSIS — E55.9 VITAMIN D DEFICIENCY: ICD-10-CM

## 2024-09-26 DIAGNOSIS — G47.9 SLEEP DISTURBANCE: ICD-10-CM

## 2024-09-26 DIAGNOSIS — F32.A ANXIETY AND DEPRESSION: ICD-10-CM

## 2024-09-26 PROCEDURE — 87624 HPV HI-RISK TYP POOLED RSLT: CPT | Performed by: NURSE PRACTITIONER

## 2024-09-26 PROCEDURE — 90673 RIV3 VACCINE NO PRESERV IM: CPT | Performed by: NURSE PRACTITIONER

## 2024-09-26 PROCEDURE — 90471 IMMUNIZATION ADMIN: CPT | Performed by: NURSE PRACTITIONER

## 2024-09-26 PROCEDURE — 99396 PREV VISIT EST AGE 40-64: CPT | Mod: 57 | Performed by: NURSE PRACTITIONER

## 2024-09-26 PROCEDURE — G0123 SCREEN CERV/VAG THIN LAYER: HCPCS | Performed by: NURSE PRACTITIONER

## 2024-09-26 PROCEDURE — 99214 OFFICE O/P EST MOD 30 MIN: CPT | Mod: 25 | Performed by: NURSE PRACTITIONER

## 2024-09-26 RX ORDER — MIRTAZAPINE 15 MG/1
15 TABLET, FILM COATED ORAL AT BEDTIME
Qty: 90 TABLET | Refills: 3 | Status: SHIPPED | OUTPATIENT
Start: 2024-09-26

## 2024-09-26 RX ORDER — ALENDRONATE SODIUM 70 MG/1
70 TABLET ORAL
Qty: 12 TABLET | Refills: 3 | Status: SHIPPED | OUTPATIENT
Start: 2024-09-26

## 2024-09-26 NOTE — PROGRESS NOTES
Preventive Care Visit  [facility] ОЛЕГ Bran CNP, Family Medicine  Sep 26, 2024      Assessment & Plan     Age-related osteoporosis without current pathological fracture  Patient will continue calcium supplementation and will reduce Vitamin D intake to every other day rather than daily due to elevated levels.  Patient will recheck bone mineral density next year at annual visit.  Patient will continue to take alendronate  Most recent labs reviewed with patient today in clinic.  Refill of Fosamax sent.  Follow-up in 1 year (FOSAMAX) as instructed without change.    - alendronate (FOSAMAX) 70 MG tablet; Take 1 tablet (70 mg) by mouth every 7 days.    Sleep disturbance  Working well to control symptoms.  Refill sent.  Follow-up in 1 year  - mirtazapine (REMERON) 15 MG tablet; Take 1 tablet (15 mg) by mouth at bedtime.    Anxiety and depression  Working well to control symptoms.  Refill sent.  Follow-up in 1 year  - mirtazapine (REMERON) 15 MG tablet; Take 1 tablet (15 mg) by mouth at bedtime.    Cervical cancer screening  PAP and HPV screening completed at today's visit.   - HPV and Gynecologic Cytology Panel - Recommended Age 30 - 65 Years    Routine general medical examination at a health care facility  Screening guidelines reviewed.     Vitamin D deficiency  Patient will reduce vitamin D supplementation every other day rather than daily due to elevated vitamin D levels.      I was present with the PA student who participated in the service and in the documentation of the note. I have verified the history and personally performed the physical exam and medical decision making. I agree with the assessment and plan of care as documented in the note. ОЛЕГ Ramirez, NP-C        Counseling  Appropriate preventive services were addressed with this patient via screening, questionnaire, or discussion as appropriate for fall prevention, nutrition, physical activity, Tobacco-use cessation,  social engagement, weight loss and cognition.  Checklist reviewing preventive services available has been given to the patient.  Reviewed patient's diet, addressing concerns and/or questions.   She is at risk for psychosocial distress and has been provided with information to reduce risk.       Jatin Kruse is a 64 year old, presenting for the following:  Physical    Aixa has concerns today for when she is supposed to discontinue her alendronate (FASAMAX) administration, PAP smear, and annual physical.  Otherwise no acute concerns for today's visit.          9/26/2024    10:16 AM   Additional Questions   Roomed by Sneha GAMEZ         9/26/2024    10:16 AM   Patient Reported Additional Medications   Patient reports taking the following new medications None per patient        Health Care Directive  Patient does not have a Health Care Directive or Living Will: Discussed advance care planning with patient; information given to patient to review.    HPI    Pt feels like crackling noise in lungs when laying on back.           9/21/2024   General Health   How would you rate your overall physical health? Good   Feel stress (tense, anxious, or unable to sleep) Only a little      (!) STRESS CONCERN      9/21/2024   Nutrition   Three or more servings of calcium each day? Yes   Diet: Regular (no restrictions)   How many servings of fruit and vegetables per day? 4 or more   How many sweetened beverages each day? 0-1            9/21/2024   Exercise   Days per week of moderate/strenous exercise 7 days   Average minutes spent exercising at this level 40 min            9/21/2024   Social Factors   Frequency of gathering with friends or relatives More than three times a week   Worry food won't last until get money to buy more No   Food not last or not have enough money for food? No   Do you have housing? (Housing is defined as stable permanent housing and does not include staying ouside in a car, in a tent, in an abandoned  building, in an overnight shelter, or couch-surfing.) Yes   Are you worried about losing your housing? No   Lack of transportation? No   Unable to get utilities (heat,electricity)? No            9/21/2024   Fall Risk   Fallen 2 or more times in the past year? No   Trouble with walking or balance? No             9/21/2024   Dental   Dentist two times every year? Yes            9/21/2024   TB Screening   Were you born outside of the [place]? No              Today's PHQ-2 Score:       7/17/2024     1:26 PM   PHQ-2 ( 1999 [employer])   Q1: Little interest or pleasure in doing things 1   Q2: Feeling down, depressed or hopeless 1   PHQ-2 Score 2   Q1: Little interest or pleasure in doing things Several days   Q2: Feeling down, depressed or hopeless Several days   PHQ-2 Score 2         9/21/2024   Substance Use   Alcohol more than 3/day or more than 7/wk No   Do you use any other substances recreationally? No        Social History     Tobacco Use    Smoking status: Never     Passive exposure: Never    Smokeless tobacco: Never   Vaping Use    Vaping status: Never Used   Substance Use Topics    Alcohol use: Yes     Comment: seldom    Drug use: Never           8/26/2024   LAST FHS-7 RESULTS   1st degree relative breast or ovarian cancer No   Any relative bilateral breast cancer No   Any male have breast cancer No   Any ONE woman have BOTH breast AND ovarian cancer No   Any woman with breast cancer before 50yrs No   2 or more relatives with breast AND/OR ovarian cancer No   2 or more relatives with breast AND/OR bowel cancer No                 9/21/2024   STI Screening   New sexual partner(s) since last STI/HIV test? No        History of abnormal Pap smear:        9/18/2019    12:00 AM   PAP / HPV   PAP-ABSTRACT See Scanned Document           This result is from an external source.     ASCVD Risk   The 10-year ASCVD risk score (Vivienne FELIPE, et al., 2019) is: 3.2%    Values used to calculate the score:      Age: 64 years       "Sex: Female      Is Non- : No      Diabetic: No      Tobacco smoker: No      Systolic Blood Pressure: 106 mmHg      Is BP treated: No      HDL Cholesterol: 69 mg/dL      Total Cholesterol: 206 mg/dL         Reviewed and updated as needed this visit by Provider                         Objective    Exam  /60   Pulse 63   Temp 97.8  F (36.6  C) (Oral)   Resp 18   Ht 1.566 m (5' 1.65\")   Wt 59.4 kg (131 lb)   LMP  (LMP Unknown)   SpO2 99%   BMI 24.23 kg/m     Estimated body mass index is 24.23 kg/m  as calculated from the following:    Height as of this encounter: 1.566 m (5' 1.65\").    Weight as of this encounter: 59.4 kg (131 lb).    Physical Exam  Constitutional:       Appearance: Normal appearance. She is well-developed.   HENT:      Head: Normocephalic and atraumatic.      Right Ear: Tympanic membrane and external ear normal. No middle ear effusion.      Left Ear: Tympanic membrane and external ear normal.  No middle ear effusion.      Nose: No mucosal edema.      Mouth/Throat:      Pharynx: Uvula midline.   Eyes:      Pupils: Pupils are equal, round, and reactive to light.   Neck:      Thyroid: No thyromegaly.      Vascular: No carotid bruit.   Cardiovascular:      Rate and Rhythm: Normal rate and regular rhythm.      Heart sounds: Normal heart sounds.   Pulmonary:      Effort: Pulmonary effort is normal.      Breath sounds: Normal breath sounds.   Abdominal:      General: Bowel sounds are normal.      Palpations: Abdomen is soft.      Tenderness: There is no abdominal tenderness.   Genitourinary:     Labia:         Right: No lesion.         Left: No lesion.       Vagina: Normal. No vaginal discharge or erythema.      Cervix: No cervical motion tenderness or discharge.      Uterus: Not enlarged.       Adnexa:         Right: No mass or tenderness.          Left: No mass or tenderness.     Musculoskeletal:         General: Normal range of motion.      Cervical back: Normal range " of motion.   Skin:     General: Skin is warm and dry.      Findings: No rash.   Neurological:      Mental Status: She is alert.   Psychiatric:         Behavior: Behavior normal.             Signed Electronically by: ОЛЕГ Owens CNP

## 2024-09-26 NOTE — PATIENT INSTRUCTIONS
Please decrease your vitamin D to every other day.      Patient Education   Preventive Care Advice   This is general advice given by our system to help you stay healthy. However, your care team may have specific advice just for you. Please talk to your care team about your preventive care needs.  Nutrition  Eat 5 or more servings of fruits and vegetables each day.  Try wheat bread, brown rice and whole grain pasta (instead of white bread, rice, and pasta).  Get enough calcium and vitamin D. Check the label on foods and aim for 100% of the RDA (recommended daily allowance).  Lifestyle  Exercise at least 150 minutes each week  (30 minutes a day, 5 days a week).  Do muscle strengthening activities 2 days a week. These help control your weight and prevent disease.  No smoking.  Wear sunscreen to prevent skin cancer.  Have a dental exam and cleaning every 6 months.  Yearly exams  See your health care team every year to talk about:  Any changes in your health.  Any medicines your care team has prescribed.  Preventive care, family planning, and ways to prevent chronic diseases.  Shots (vaccines)   HPV shots (up to age 26), if you've never had them before.  Hepatitis B shots (up to age 59), if you've never had them before.  COVID-19 shot: Get this shot when it's due.  Flu shot: Get a flu shot every year.  Tetanus shot: Get a tetanus shot every 10 years.  Pneumococcal, hepatitis A, and RSV shots: Ask your care team if you need these based on your risk.  Shingles shot (for age 50 and up)  General health tests  Diabetes screening:  Starting at age 35, Get screened for diabetes at least every 3 years.  If you are younger than age 35, ask your care team if you should be screened for diabetes.  Cholesterol test: At age 39, start having a cholesterol test every 5 years, or more often if advised.  Bone density scan (DEXA): At age 50, ask your care team if you should have this scan for osteoporosis (brittle bones).  Hepatitis C: Get  tested at least once in your life.  STIs (sexually transmitted infections)  Before age 24: Ask your care team if you should be screened for STIs.  After age 24: Get screened for STIs if you're at risk. You are at risk for STIs (including HIV) if:  You are sexually active with more than one person.  You don't use condoms every time.  You or a partner was diagnosed with a sexually transmitted infection.  If you are at risk for HIV, ask about PrEP medicine to prevent HIV.  Get tested for HIV at least once in your life, whether you are at risk for HIV or not.  Cancer screening tests  Cervical cancer screening: If you have a cervix, begin getting regular cervical cancer screening tests starting at age 21.  Breast cancer scan (mammogram): If you've ever had breasts, begin having regular mammograms starting at age 40. This is a scan to check for breast cancer.  Colon cancer screening: It is important to start screening for colon cancer at age 45.  Have a colonoscopy test every 10 years (or more often if you're at risk) Or, ask your provider about stool tests like a FIT test every year or Cologuard test every 3 years.  To learn more about your testing options, visit:   .  For help making a decision, visit:   https://bit.ly/ja52607.  Prostate cancer screening test: If you have a prostate, ask your care team if a prostate cancer screening test (PSA) at age 55 is right for you.  Lung cancer screening: If you are a current or former smoker ages 50 to 80, ask your care team if ongoing lung cancer screenings are right for you.  For informational purposes only. Not to replace the advice of your health care provider. Copyright   2023 [employer]. All rights reserved. Clinically reviewed by the [facility] Transitions Program. [employer] [id number] - REV 01/24.

## 2024-09-27 ENCOUNTER — MYC MEDICAL ADVICE (OUTPATIENT)
Dept: FAMILY MEDICINE | Facility: CLINIC | Age: 64
End: 2024-09-27
Payer: COMMERCIAL

## 2024-09-27 LAB
HPV HR 12 DNA CVX QL NAA+PROBE: NEGATIVE
HPV16 DNA CVX QL NAA+PROBE: NEGATIVE
HPV18 DNA CVX QL NAA+PROBE: NEGATIVE
HUMAN PAPILLOMA VIRUS FINAL DIAGNOSIS: NORMAL

## 2024-10-01 LAB
BKR AP ASSOCIATED HPV REPORT: NORMAL
BKR LAB AP GYN ADEQUACY: NORMAL
BKR LAB AP GYN INTERPRETATION: NORMAL
BKR LAB AP PREVIOUS ABNORMAL: NORMAL
PATH REPORT.COMMENTS IMP SPEC: NORMAL
PATH REPORT.COMMENTS IMP SPEC: NORMAL
PATH REPORT.RELEVANT HX SPEC: NORMAL

## 2025-08-19 ENCOUNTER — MYC REFILL (OUTPATIENT)
Dept: FAMILY MEDICINE | Facility: CLINIC | Age: 65
End: 2025-08-19
Payer: COMMERCIAL

## 2025-08-19 DIAGNOSIS — M81.0 AGE-RELATED OSTEOPOROSIS WITHOUT CURRENT PATHOLOGICAL FRACTURE: ICD-10-CM

## 2025-08-19 RX ORDER — ALENDRONATE SODIUM 70 MG/1
70 TABLET ORAL
Qty: 12 TABLET | Refills: 0 | Status: SHIPPED | OUTPATIENT
Start: 2025-08-19

## 2025-08-27 ENCOUNTER — PATIENT OUTREACH (OUTPATIENT)
Dept: CARE COORDINATION | Facility: CLINIC | Age: 65
End: 2025-08-27
Payer: COMMERCIAL

## 2025-09-03 ENCOUNTER — MYC MEDICAL ADVICE (OUTPATIENT)
Dept: FAMILY MEDICINE | Facility: CLINIC | Age: 65
End: 2025-09-03
Payer: COMMERCIAL

## 2025-09-03 ENCOUNTER — PATIENT OUTREACH (OUTPATIENT)
Dept: CARE COORDINATION | Facility: CLINIC | Age: 65
End: 2025-09-03
Payer: COMMERCIAL

## 2025-09-04 DIAGNOSIS — Z12.11 COLON CANCER SCREENING: ICD-10-CM
